# Patient Record
Sex: MALE | Race: BLACK OR AFRICAN AMERICAN | Employment: UNEMPLOYED | URBAN - METROPOLITAN AREA
[De-identification: names, ages, dates, MRNs, and addresses within clinical notes are randomized per-mention and may not be internally consistent; named-entity substitution may affect disease eponyms.]

---

## 2017-07-04 ENCOUNTER — HOSPITAL ENCOUNTER (EMERGENCY)
Age: 27
Discharge: PSYCHIATRIC OR SUBSTANCE USE FACILITY | End: 2017-07-05
Attending: EMERGENCY MEDICINE
Payer: SELF-PAY

## 2017-07-04 ENCOUNTER — APPOINTMENT (OUTPATIENT)
Dept: CT IMAGING | Age: 27
End: 2017-07-04
Attending: EMERGENCY MEDICINE
Payer: SELF-PAY

## 2017-07-04 DIAGNOSIS — R45.851 SUICIDAL IDEATION: Primary | ICD-10-CM

## 2017-07-04 LAB
ALBUMIN SERPL BCP-MCNC: 3.8 G/DL (ref 3.4–5)
ALBUMIN/GLOB SERPL: 0.9 {RATIO} (ref 0.8–1.7)
ALP SERPL-CCNC: 90 U/L (ref 45–117)
ALT SERPL-CCNC: 26 U/L (ref 16–61)
ANION GAP BLD CALC-SCNC: 5 MMOL/L (ref 3–18)
AST SERPL W P-5'-P-CCNC: 29 U/L (ref 15–37)
BASOPHILS # BLD AUTO: 0 K/UL (ref 0–0.06)
BASOPHILS # BLD: 0 % (ref 0–3)
BILIRUB SERPL-MCNC: 1 MG/DL (ref 0.2–1)
BUN SERPL-MCNC: 13 MG/DL (ref 7–18)
BUN/CREAT SERPL: 14 (ref 12–20)
CALCIUM SERPL-MCNC: 9.3 MG/DL (ref 8.5–10.1)
CHLORIDE SERPL-SCNC: 111 MMOL/L (ref 100–108)
CO2 SERPL-SCNC: 27 MMOL/L (ref 21–32)
CREAT SERPL-MCNC: 0.92 MG/DL (ref 0.6–1.3)
DIFFERENTIAL METHOD BLD: ABNORMAL
EOSINOPHIL # BLD: 0.1 K/UL (ref 0–0.4)
EOSINOPHIL NFR BLD: 1 % (ref 0–5)
ERYTHROCYTE [DISTWIDTH] IN BLOOD BY AUTOMATED COUNT: 13.7 % (ref 11.6–14.5)
ETHANOL SERPL-MCNC: <3 MG/DL (ref 0–3)
GLOBULIN SER CALC-MCNC: 4.4 G/DL (ref 2–4)
GLUCOSE SERPL-MCNC: 103 MG/DL (ref 74–99)
HCT VFR BLD AUTO: 40.2 % (ref 36–48)
HGB BLD-MCNC: 14.1 G/DL (ref 13–16)
LYMPHOCYTES # BLD AUTO: 32 % (ref 20–51)
LYMPHOCYTES # BLD: 2.9 K/UL (ref 0.8–3.5)
MCH RBC QN AUTO: 31.3 PG (ref 24–34)
MCHC RBC AUTO-ENTMCNC: 35.1 G/DL (ref 31–37)
MCV RBC AUTO: 89.3 FL (ref 74–97)
MONOCYTES # BLD: 0.4 K/UL (ref 0–1)
MONOCYTES NFR BLD AUTO: 4 % (ref 2–9)
NEUTS BAND NFR BLD MANUAL: 1 % (ref 0–5)
NEUTS SEG # BLD: 5.4 K/UL (ref 1.8–8)
NEUTS SEG NFR BLD AUTO: 58 % (ref 42–75)
PLATELET # BLD AUTO: 254 K/UL (ref 135–420)
PLATELET COMMENTS,PCOM: ABNORMAL
PMV BLD AUTO: 9.6 FL (ref 9.2–11.8)
POTASSIUM SERPL-SCNC: 4 MMOL/L (ref 3.5–5.5)
PROT SERPL-MCNC: 8.2 G/DL (ref 6.4–8.2)
RBC # BLD AUTO: 4.5 M/UL (ref 4.7–5.5)
RBC MORPH BLD: ABNORMAL
SODIUM SERPL-SCNC: 143 MMOL/L (ref 136–145)
WBC # BLD AUTO: 9.1 K/UL (ref 4.6–13.2)
WBC OTHER # BLD MANUAL: 4 10*3/UL

## 2017-07-04 PROCEDURE — 80053 COMPREHEN METABOLIC PANEL: CPT | Performed by: EMERGENCY MEDICINE

## 2017-07-04 PROCEDURE — 80361 OPIATES 1 OR MORE: CPT | Performed by: EMERGENCY MEDICINE

## 2017-07-04 PROCEDURE — 80353 DRUG SCREENING COCAINE: CPT | Performed by: EMERGENCY MEDICINE

## 2017-07-04 PROCEDURE — 80307 DRUG TEST PRSMV CHEM ANLYZR: CPT | Performed by: EMERGENCY MEDICINE

## 2017-07-04 PROCEDURE — 82542 COL CHROMOTOGRAPHY QUAL/QUAN: CPT | Performed by: EMERGENCY MEDICINE

## 2017-07-04 PROCEDURE — 70450 CT HEAD/BRAIN W/O DYE: CPT

## 2017-07-04 PROCEDURE — 99284 EMERGENCY DEPT VISIT MOD MDM: CPT

## 2017-07-04 PROCEDURE — 85025 COMPLETE CBC W/AUTO DIFF WBC: CPT | Performed by: EMERGENCY MEDICINE
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
## 2017-07-04 NOTE — ED NOTES
Bedside shift change report given to Greer Shukla (oncoming nurse) by Krishna Jones (offgoing nurse). Report included the following information SBAR, ED Summary, Procedure Summary, Intake/Output, MAR, Accordion, Recent Results and Med Rec Status.
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
## 2017-07-04 NOTE — BSMART NOTE
Contacted by ER Physician Dr. Griffin Bynum regarding this patient a 33year old male who was seen in the ER Bed: 21.  Patient presents with depression, suicidal thoughts and substance abuse. Patient states, \"I am here because I am depressed and suicidal.  I have a fucked up life. \"  He has been homeless for [place] 2 months\" after living with a friend who has since moved from the area. He states he is originally from [place] but, \"There ain't nothing, nobody there for me. \"  He has a history of suicidal acts as patient states, \"I cut my wrists a couple of years ago. \"  He denies homicidal ideations/thoughts to harm others. He indicates that he has visual (a grant) and auditory (humans telling him, \"You're better off dead. \") hallucinations. He denies any inpatient treatment for depression and/or suicidal ideations but states \"I was at [facility] in [place] at the end of last year, for 3 months, for rehab for crack cocaine. \"  He admits to snorting heroine in the past, smoking marijuana and using crack cocaine. Patient states, \"I just need some help. \"      Patient will be referred to [facility] for assessment and evaluation for a possible Crisis Stabilization Unit or other facility.         Travis Darnell, RN, BSN

## 2017-07-05 VITALS
DIASTOLIC BLOOD PRESSURE: 72 MMHG | TEMPERATURE: 98.5 F | BODY MASS INDEX: 16.81 KG/M2 | HEIGHT: 74 IN | OXYGEN SATURATION: 98 % | SYSTOLIC BLOOD PRESSURE: 107 MMHG | HEART RATE: 61 BPM | WEIGHT: 131 LBS | RESPIRATION RATE: 18 BRPM

## 2017-07-05 NOTE — ED NOTES
Received bedside report from Halley Torres RN, pt Is alert and oriented, calm and cooperative at this time. Pt awaiting placement. Pt states he wanted to kill himslef by walking in to traffic.  Pt has no prior medical or mental health history, takes no medications has never been admitted for psych issues in the past.

## 2017-07-05 NOTE — ED NOTES
The L.C. met with the client prior to being discharged from the ED to discuss follow up healthcare/PCP options after being discharged. The client received a schedule for the month of July 2017 [address] [place] [address]. The L. C. briefly explained the process and informed the client they will receive a follow up call from the [address] [place] [address] on Monday, July 10, 2017 for an overall health check.

## 2017-07-05 NOTE — BSMART NOTE
Jose G Guerra of [employer] has informed that the patient has been accepted to [facility] in [place].     Admission is for Today July 5 @ 1400  Accepting is Dr. Steven Skill  Report # [phone]

## 2017-07-13 LAB
AMPHET UR QL SCN: NEGATIVE
BARBITURATES UR QL SCN: NEGATIVE
BENZODIAZ UR QL: NEGATIVE
BZE UR CFM-MCNC: NORMAL NG/ML
BZE UR QL: NORMAL
CANNABINOIDS UR QL CFM: POSITIVE
CANNABINOIDS UR QL SCN: POSITIVE
COCAINE UR QL SCN: POSITIVE
CODEINE UR QL: NEGATIVE
HDSCOM,HDSCOM: ABNORMAL
HYDROCODONE UR QL: NEGATIVE
HYDROMORPHONE UR QL: NEGATIVE
METHADONE UR QL: NEGATIVE
MORPHINE UR CFM-MCNC: 702 NG/ML
MORPHINE UR QL: POSITIVE
OPIATES UR QL: POSITIVE
OPIATES UR QL: POSITIVE NG/ML
PCP UR QL: NEGATIVE
THC UR CFM-MCNC: 21 NG/ML

## 2017-08-16 ENCOUNTER — APPOINTMENT (OUTPATIENT)
Dept: GENERAL RADIOLOGY | Age: 27
End: 2017-08-16
Attending: EMERGENCY MEDICINE
Payer: SELF-PAY

## 2017-08-16 ENCOUNTER — HOSPITAL ENCOUNTER (EMERGENCY)
Age: 27
Discharge: HOME | End: 2017-08-16
Attending: EMERGENCY MEDICINE | Admitting: EMERGENCY MEDICINE
Payer: SELF-PAY

## 2017-08-16 ENCOUNTER — APPOINTMENT (OUTPATIENT)
Dept: CT IMAGING | Age: 27
End: 2017-08-16
Attending: EMERGENCY MEDICINE
Payer: SELF-PAY

## 2017-08-16 VITALS
DIASTOLIC BLOOD PRESSURE: 79 MMHG | OXYGEN SATURATION: 100 % | HEART RATE: 76 BPM | RESPIRATION RATE: 16 BRPM | TEMPERATURE: 97.7 F | WEIGHT: 131 LBS | BODY MASS INDEX: 16.82 KG/M2 | SYSTOLIC BLOOD PRESSURE: 109 MMHG

## 2017-08-16 DIAGNOSIS — S09.90XA CHI (CLOSED HEAD INJURY), INITIAL ENCOUNTER: ICD-10-CM

## 2017-08-16 DIAGNOSIS — S00.81XA FOREHEAD ABRASION, INITIAL ENCOUNTER: ICD-10-CM
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
## 2017-08-16 DIAGNOSIS — F10.920 ALCOHOL INTOXICATION, UNCOMPLICATED (HCC): ICD-10-CM

## 2017-08-16 DIAGNOSIS — R51.9 FACIAL PAIN: ICD-10-CM

## 2017-08-16 DIAGNOSIS — E87.6 HYPOKALEMIA: ICD-10-CM

## 2017-08-16 LAB
ANION GAP BLD CALC-SCNC: 13 MMOL/L (ref 3–18)
BASOPHILS # BLD: 0 K/UL (ref 0–0.06)
BASOPHILS NFR BLD: 0 % (ref 0–2)
BUN SERPL-MCNC: 8 MG/DL (ref 7–18)
BUN/CREAT SERPL: 13 (ref 12–20)
CALCIUM SERPL-MCNC: 7.5 MG/DL (ref 8.5–10.1)
CHLORIDE SERPL-SCNC: 108 MMOL/L (ref 100–108)
CO2 SERPL-SCNC: 25 MMOL/L (ref 21–32)
CREAT SERPL-MCNC: 0.62 MG/DL (ref 0.6–1.3)
DIFFERENTIAL METHOD BLD: ABNORMAL
EOSINOPHIL # BLD: 0.2 K/UL (ref 0–0.4)
EOSINOPHIL NFR BLD: 2 % (ref 0–5)
ERYTHROCYTE [DISTWIDTH] IN BLOOD BY AUTOMATED COUNT: 14.5 % (ref 11.6–14.5)
ETHANOL SERPL-MCNC: 84 MG/DL (ref 0–3)
GLUCOSE SERPL-MCNC: 88 MG/DL (ref 74–99)
HCT VFR BLD AUTO: 41.4 % (ref 36–48)
HGB BLD-MCNC: 13.6 G/DL (ref 13–16)
LYMPHOCYTES # BLD: 2.1 K/UL (ref 0.9–3.6)
LYMPHOCYTES NFR BLD: 16 % (ref 21–52)
MCH RBC QN AUTO: 30.7 PG (ref 24–34)
MCHC RBC AUTO-ENTMCNC: 32.9 G/DL (ref 31–37)
MCV RBC AUTO: 93.5 FL (ref 74–97)
MONOCYTES # BLD: 0.7 K/UL (ref 0.05–1.2)
MONOCYTES NFR BLD: 5 % (ref 3–10)
NEUTS SEG # BLD: 10.1 K/UL (ref 1.8–8)
NEUTS SEG NFR BLD: 77 % (ref 40–73)
PLATELET # BLD AUTO: 245 K/UL (ref 135–420)
PMV BLD AUTO: 9.7 FL (ref 9.2–11.8)
POTASSIUM SERPL-SCNC: 3.3 MMOL/L (ref 3.5–5.5)
RBC # BLD AUTO: 4.43 M/UL (ref 4.7–5.5)
SODIUM SERPL-SCNC: 146 MMOL/L (ref 136–145)
WBC # BLD AUTO: 13.1 K/UL (ref 4.6–13.2)

## 2017-08-16 PROCEDURE — 74011250636 HC RX REV CODE- 250/636: Performed by: EMERGENCY MEDICINE

## 2017-08-16 PROCEDURE — 71010 XR CHEST PORT: CPT

## 2017-08-16 PROCEDURE — 70486 CT MAXILLOFACIAL W/O DYE: CPT

## 2017-08-16 PROCEDURE — 72125 CT NECK SPINE W/O DYE: CPT

## 2017-08-16 PROCEDURE — 85025 COMPLETE CBC W/AUTO DIFF WBC: CPT | Performed by: EMERGENCY MEDICINE

## 2017-08-16 PROCEDURE — 74011250637 HC RX REV CODE- 250/637: Performed by: EMERGENCY MEDICINE

## 2017-08-16 PROCEDURE — 99284 EMERGENCY DEPT VISIT MOD MDM: CPT

## 2017-08-16 PROCEDURE — 70450 CT HEAD/BRAIN W/O DYE: CPT

## 2017-08-16 PROCEDURE — 96360 HYDRATION IV INFUSION INIT: CPT

## 2017-08-16 PROCEDURE — 80048 BASIC METABOLIC PNL TOTAL CA: CPT | Performed by: EMERGENCY MEDICINE

## 2017-08-16 PROCEDURE — 96361 HYDRATE IV INFUSION ADD-ON: CPT

## 2017-08-16 PROCEDURE — 80307 DRUG TEST PRSMV CHEM ANLYZR: CPT | Performed by: EMERGENCY MEDICINE

## 2017-08-16 RX ORDER — POTASSIUM CHLORIDE 20 MEQ/1
20 TABLET, EXTENDED RELEASE ORAL
Status: COMPLETED | OUTPATIENT
Start: 2017-08-16 | End: 2017-08-16

## 2017-08-16 RX ADMIN — SODIUM CHLORIDE 1000 ML: 900 INJECTION, SOLUTION INTRAVENOUS at 08:09

## 2017-08-16 RX ADMIN — POTASSIUM CHLORIDE 20 MEQ: 20 TABLET, EXTENDED RELEASE ORAL at 07:21

## 2017-08-16 NOTE — DISCHARGE INSTRUCTIONS
Scrapes (Abrasions): Care Instructions  Your Care Instructions  Scrapes (abrasions) are wounds where your skin has been rubbed or torn off. Most scrapes do not go deep into the skin, but some may remove several layers of skin. Scrapes usually don't bleed much, but they may ooze pinkish fluid. Scrapes on the head or face may appear worse than they are. They may bleed a lot because of the good blood supply to this area. Most scrapes heal well and may not need a bandage. They usually heal within 3 to 7 days. A large, deep scrape may take 1 to 2 weeks or longer to heal. A scab may form on some scrapes. Follow-up care is a key part of your treatment and safety. Be sure to make and go to all appointments, and call your doctor if you are having problems. It's also a good idea to know your test results and keep a list of the medicines you take. How can you care for yourself at home? · If your doctor told you how to care for your wound, follow your doctor's instructions. If you did not get instructions, follow this general advice:  ¨ Wash the scrape with clean water 2 times a day. Don't use hydrogen peroxide or alcohol, which can slow healing. ¨ You may cover the scrape with a thin layer of petroleum jelly, such as Vaseline, and a nonstick bandage. ¨ Apply more petroleum jelly and replace the bandage as needed. · Prop up the injured area on a pillow anytime you sit or lie down during the next 3 days. Try to keep it above the level of your heart. This will help reduce swelling. · Be safe with medicines. Take pain medicines exactly as directed. ¨ If the doctor gave you a prescription medicine for pain, take it as prescribed. ¨ If you are not taking a prescription pain medicine, ask your doctor if you can take an over-the-counter medicine. When should you call for help?   Call your doctor now or seek immediate medical care if:  · You have signs of infection, such as:  ¨ Increased pain, swelling, warmth, or redness around the scrape. ¨ Red streaks leading from the scrape. ¨ Pus draining from the scrape. ¨ A fever. · The scrape starts to bleed, and blood soaks through the bandage. Oozing small amounts of blood is normal.  Watch closely for changes in your health, and be sure to contact your doctor if the scrape is not getting better each day. Where can you learn more? Go to http://regine-lupe.info/. Enter A374 in the search box to learn more about \"Scrapes (Abrasions): Care Instructions. \"  Current as of: March 20, 2017  Content Version: 11.3  © 7457-5653 [employer]. Care instructions adapted under license by [employer] (which disclaims liability or warranty for this information). If you have questions about a medical condition or this instruction, always ask your healthcare professional. [place] [address] any warranty or liability for your use of this information. Head Injury: Care Instructions  Your Care Instructions  Most injuries to the head are minor. Bumps, cuts, and scrapes on the head and face usually heal well and can be treated the same as injuries to other parts of the body. Although it's rare, once in a while a more serious problem shows up after you are home. So it's good to be on the lookout for symptoms for a day or two. Follow-up care is a key part of your treatment and safety. Be sure to make and go to all appointments, and call your doctor if you are having problems. It's also a good idea to know your test results and keep a list of the medicines you take. How can you care for yourself at home? · Follow your doctor's instructions. He or she will tell you if you need someone to watch you closely for the next 24 hours or longer. · Take it easy for the next few days or more if you are not feeling well.   · Ask your doctor when it's okay for you to go back to activities like driving a car, riding a bike, or operating machinery. When should you call for help? Call 911 anytime you think you may need emergency care. For example, call if:  · You have a seizure. · You passed out (lost consciousness). · You are confused or can't stay awake. Call your doctor now or seek immediate medical care if:  · You have new or worse vomiting. · You feel less alert. · You have new weakness or numbness in any part of your body. Watch closely for changes in your health, and be sure to contact your doctor if:  · You do not get better as expected. · You have new symptoms, such as headaches, trouble concentrating, or changes in mood. Where can you learn more? Go to http://regine-lupe.info/. Enter Y638 in the search box to learn more about \"Head Injury: Care Instructions. \"  Current as of: October 14, 2016  Content Version: 11.3  © 3092-1111 [employer]. Care instructions adapted under license by [employer] (which disclaims liability or warranty for this information). If you have questions about a medical condition or this instruction, always ask your healthcare professional. [place] [address] any warranty or liability for your use of this information. Hypokalemia: Care Instructions  Your Care Instructions  Hypokalemia (say \"em-xg-gkw-JEROME-chet-uh\") is a low level of potassium. The heart, muscles, kidneys, and nervous system all need potassium to work well. This problem has many different causes. Kidney problems, diet, and medicines like diuretics and laxatives can cause it. So can vomiting or diarrhea. In some cases, cancer is the cause. Your doctor may do tests to find the cause of your low potassium levels. You may need medicines to bring your potassium levels back to normal. You may also need regular blood tests to check your potassium. If you have very low potassium, you may need intravenous (IV) medicines.  You also may need tests to check the electrical activity of your heart. Heart problems caused by low potassium levels can be very serious. Follow-up care is a key part of your treatment and safety. Be sure to make and go to all appointments, and call your doctor if you are having problems. It's also a good idea to know your test results and keep a list of the medicines you take. How can you care for yourself at home? · If your doctor recommends it, eat foods that have a lot of potassium. These include fresh fruits, juices, and vegetables. They also include nuts, beans, and milk. · Be safe with medicines. If your doctor prescribes medicines or potassium supplements, take them exactly as directed. Call your doctor if you have any problems with your medicines. · Get your potassium levels tested as often as your doctor tells you. When should you call for help? Call 911 anytime you think you may need emergency care. For example, call if:  · You feel like your heart is missing beats. Heart problems caused by low potassium can cause death. · You passed out (lost consciousness). · You have a seizure. Call your doctor now or seek immediate medical care if:  · You feel weak or unusually tired. · You have severe arm or leg cramps. · You have tingling or numbness. · You feel sick to your stomach, or you vomit. · You have belly cramps. · You feel bloated or constipated. · You have to urinate a lot. · You feel very thirsty most of the time. · You are dizzy or lightheaded, or you feel like you may faint. · You feel depressed, or you lose touch with reality. Watch closely for changes in your health, and be sure to contact your doctor if:  · You do not get better as expected. Where can you learn more? Go to http://regine-lupe.info/. Enter G358 in the search box to learn more about \"Hypokalemia: Care Instructions. \"  Current as of: July 28, 2016  Content Version: 11.3  © 4080-8576 [facility].  Care instructions adapted under license by [employer] (which disclaims liability or warranty for this information). If you have questions about a medical condition or this instruction, always ask your healthcare professional. [address] any warranty or liability for your use of this information.

## 2017-08-16 NOTE — ED NOTES
Pt asked to attempt to ambulate and will be discharged soon. Pt ambulatory to bathroom without difficulty. Upon attempting to return to room pt stumbling about. Pt assisted to room and back to bed. MD at bedside to evaluate pt. Will hold on discharge for now.

## 2017-08-16 NOTE — ED NOTES
Patient is sleeping at this time. Patient is easily arousable by verbal stimuli. Will continue to monitor.

## 2017-08-16 NOTE — ED NOTES
Pt ambulatory to bathroom with steady gate. Pt returned to bathroom without any walking difficulty/imbalance. MD aware.
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
## 2017-08-16 NOTE — ED NOTES
7:00 AM :Pt care assumed from Dr. Chyna Plascencia, ED provider. Pt complaint(s), current treatment plan, progression and available diagnostic results have been discussed thoroughly. Rounding occurred: yes  Intended Disposition: TBD   Pending diagnostic reports and/or labs (please list): reassessment     9:18 AM Patient reassessed. Awake, alert and oriented. Ambulating with steady gait. Sitting up in bed eating crackers and drinking juice. Stable for discharge.

## 2017-09-18 ENCOUNTER — HOSPITAL ENCOUNTER (EMERGENCY)
Age: 27
Discharge: OTHER INSTITUTION | End: 2017-09-18
Attending: EMERGENCY MEDICINE
Payer: SELF-PAY

## 2017-09-18 VITALS
BODY MASS INDEX: 16.81 KG/M2 | SYSTOLIC BLOOD PRESSURE: 117 MMHG | DIASTOLIC BLOOD PRESSURE: 72 MMHG | TEMPERATURE: 98 F | OXYGEN SATURATION: 99 % | WEIGHT: 131 LBS | RESPIRATION RATE: 12 BRPM | HEIGHT: 74 IN | HEART RATE: 68 BPM

## 2017-09-18 DIAGNOSIS — R45.851 SUICIDAL IDEATIONS: Primary | ICD-10-CM

## 2017-09-18 LAB
ALBUMIN SERPL-MCNC: 3.8 G/DL (ref 3.4–5)
ALBUMIN/GLOB SERPL: 0.9 {RATIO} (ref 0.8–1.7)
ALP SERPL-CCNC: 88 U/L (ref 45–117)
ALT SERPL-CCNC: 25 U/L (ref 16–61)
AMPHET UR QL SCN: NEGATIVE
ANION GAP SERPL CALC-SCNC: 7 MMOL/L (ref 3–18)
APPEARANCE UR: CLEAR
AST SERPL-CCNC: 18 U/L (ref 15–37)
BARBITURATES UR QL SCN: NEGATIVE
BASOPHILS # BLD: 0 K/UL (ref 0–0.06)
BASOPHILS NFR BLD: 0 % (ref 0–2)
BENZODIAZ UR QL: NEGATIVE
BILIRUB SERPL-MCNC: 0.5 MG/DL (ref 0.2–1)
BILIRUB UR QL: NEGATIVE
BUN SERPL-MCNC: 13 MG/DL (ref 7–18)
BUN/CREAT SERPL: 14 (ref 12–20)
CALCIUM SERPL-MCNC: 9.3 MG/DL (ref 8.5–10.1)
CANNABINOIDS UR QL SCN: POSITIVE
CHLORIDE SERPL-SCNC: 105 MMOL/L (ref 100–108)
CO2 SERPL-SCNC: 28 MMOL/L (ref 21–32)
COCAINE UR QL SCN: POSITIVE
COLOR UR: YELLOW
CREAT SERPL-MCNC: 0.94 MG/DL (ref 0.6–1.3)
DIFFERENTIAL METHOD BLD: ABNORMAL
EOSINOPHIL # BLD: 0.6 K/UL (ref 0–0.4)
EOSINOPHIL NFR BLD: 5 % (ref 0–5)
ERYTHROCYTE [DISTWIDTH] IN BLOOD BY AUTOMATED COUNT: 13.3 % (ref 11.6–14.5)
ETHANOL SERPL-MCNC: 3 MG/DL (ref 0–3)
GLOBULIN SER CALC-MCNC: 4.4 G/DL (ref 2–4)
GLUCOSE SERPL-MCNC: 104 MG/DL (ref 74–99)
GLUCOSE UR STRIP.AUTO-MCNC: NEGATIVE MG/DL
HCT VFR BLD AUTO: 45.1 % (ref 36–48)
HDSCOM,HDSCOM: ABNORMAL
HGB BLD-MCNC: 15.5 G/DL (ref 13–16)
HGB UR QL STRIP: NEGATIVE
KETONES UR QL STRIP.AUTO: NEGATIVE MG/DL
LEUKOCYTE ESTERASE UR QL STRIP.AUTO: NEGATIVE
LYMPHOCYTES # BLD: 3.9 K/UL (ref 0.9–3.6)
LYMPHOCYTES NFR BLD: 34 % (ref 21–52)
MCH RBC QN AUTO: 32.1 PG (ref 24–34)
MCHC RBC AUTO-ENTMCNC: 34.4 G/DL (ref 31–37)
MCV RBC AUTO: 93.4 FL (ref 74–97)
METHADONE UR QL: NEGATIVE
MONOCYTES # BLD: 0.7 K/UL (ref 0.05–1.2)
MONOCYTES NFR BLD: 6 % (ref 3–10)
NEUTS SEG # BLD: 6 K/UL (ref 1.8–8)
NEUTS SEG NFR BLD: 55 % (ref 40–73)
NITRITE UR QL STRIP.AUTO: NEGATIVE
OPIATES UR QL: NEGATIVE
PCP UR QL: NEGATIVE
PH UR STRIP: 6.5 [PH] (ref 5–8)
PLATELET # BLD AUTO: 230 K/UL (ref 135–420)
PMV BLD AUTO: 10 FL (ref 9.2–11.8)
POTASSIUM SERPL-SCNC: 3.7 MMOL/L (ref 3.5–5.5)
PROT SERPL-MCNC: 8.2 G/DL (ref 6.4–8.2)
PROT UR STRIP-MCNC: NEGATIVE MG/DL
RBC # BLD AUTO: 4.83 M/UL (ref 4.7–5.5)
SODIUM SERPL-SCNC: 140 MMOL/L (ref 136–145)
SP GR UR REFRACTOMETRY: 1.02 (ref 1–1.03)
UROBILINOGEN UR QL STRIP.AUTO: 2 EU/DL (ref 0.2–1)
WBC # BLD AUTO: 11.2 K/UL (ref 4.6–13.2)

## 2017-09-18 PROCEDURE — 80307 DRUG TEST PRSMV CHEM ANLYZR: CPT | Performed by: EMERGENCY MEDICINE

## 2017-09-18 PROCEDURE — 80053 COMPREHEN METABOLIC PANEL: CPT | Performed by: EMERGENCY MEDICINE

## 2017-09-18 PROCEDURE — 99285 EMERGENCY DEPT VISIT HI MDM: CPT

## 2017-09-18 PROCEDURE — 85025 COMPLETE CBC W/AUTO DIFF WBC: CPT | Performed by: EMERGENCY MEDICINE

## 2017-09-18 PROCEDURE — 81003 URINALYSIS AUTO W/O SCOPE: CPT | Performed by: EMERGENCY MEDICINE

## 2017-09-18 RX ORDER — POTASSIUM CHLORIDE 20 MEQ/1
40 TABLET, EXTENDED RELEASE ORAL
Status: DISCONTINUED | OUTPATIENT
Start: 2017-09-18 | End: 2017-09-18

## 2017-09-18 NOTE — ED NOTES
Pt ate breakfast and was given a cup of apple juice. Pt is polite and compliant watching tv in bed. Pt in NAD and call button is within reach.

## 2017-09-18 NOTE — ED TRIAGE NOTES
Pt states he feels like hurting himself. Pt states life is wrong and he is thinking about walking into traffic.

## 2017-09-18 NOTE — ED NOTES
Bedside shift change report given to [facility] (oncoming nurse) by Haley Hickman RN (offgoing nurse). Report included the following information SBAR.

## 2017-09-18 NOTE — ED NOTES
TRANSFER - OUT REPORT:    Verbal report given to [facility], RN(name) on Paola Ruiz  being transferred to [facility] (unit) for routine progression of care       Report consisted of patients Situation, Background, Assessment and   Recommendations(SBAR). Information from the following report(s) SBAR, ED Summary and Recent Results was reviewed with the receiving nurse. Lines:       Opportunity for questions and clarification was provided.       Transported by [facility] [address] [place] [address]

## 2017-09-18 NOTE — BSMART NOTE
Per Enmanuel Loja with [facility] ([facility]), patient has been accepted at [facility] (CSU). Accepting physician is Dr. Sylvester Bustamante. Patient should be transported from [facility] ER to arrive at CSU by 2100 hours today, Monday, September 18, 2017.       Simon Olivares RN, BSN

## 2017-09-18 NOTE — ED NOTES
7:18 AM :Pt care assumed from Dr. Heriberto Pruitt, ED provider. Pt complaint(s), current treatment plan, progression and available diagnostic results have been discussed thoroughly. Rounding occurred: yes  Intended Disposition: TBD   Pending diagnostic reports and/or labs (please list): to be seen by crisis    9:44 AM  Update: Yazmin Dela Cruz from crisis has evaluated patient and will discuss patient with CSB for crisis stabilization. 1:33 PM Pt seen by CSB and she is putting in request for crisis stabilization. Pt resting, cooperative, no apparent distress. 5:25 PM  Patient has been accepted by Dr. [facility] at [address]. Patient Vitals for the past 12 hrs:   Temp Pulse Resp BP SpO2   09/18/17 0816 98 °F (36.7 °C) 68 12 117/72 99 %   09/18/17 0428 97.2 °F (36.2 °C) 70 16 126/86 100 %       Scribe Attestation      [employer] acting as a scribe for and in the presence of Karl Glaser DO      September 18, 2017 at 9:44 AM       Provider Attestation:      I personally performed the services described in the documentation, reviewed the documentation, as recorded by the scribe in my presence, and it accurately and completely records my words and actions. September 18, 2017 at 9:44 AM - Karl Glaser DO      1.  Suicidal ideations

## 2017-09-18 NOTE — BSMART NOTE
33year old M seen in the ED room 13 at the request of Dr. Kenzie Wooten. CC: Feels depressed with frequent SI, last occurring at 4:30 this morning. Planned to walk into traffic. \"I want a better way of doing things. \"     Standing by bed when this writer went to his room. Patient had just finished breakfast. Adequate hygiene, neat appearance. Eye contact good. Cooperative, answering all questions. Alert and oriented x4. Affect restricted. Describes mood as depressed and sleepy. Unemployed. Has not worked since last year. Single. May 2016 he moved from [facility] to [place]. He is currently Homeless as of 1.5 months ago. Sleeps on benches in parking lots. History of being treated at [facility] in July. Was unable to make follow-up appointsment due to lack of transportation. Since then, the patient's sister has become more involved and is willing to assist patient. On no medications. Denies Other medical or surgical issues. Thought process goal directed; although patient endorses occasional racing thoughts. Thought content unremarkable. Denies Auditory/Visual hallucinations. Denies Homicidal ideation. Endorses vague suicidal ideation. Sleeps 3-4 hours a night. Appetite good although he states he has had some weight loss. UDS + for [facility] and cocaine. Uses \"a lot\" of crack daily. Smokes 1 blunt a week. Drink 2 24 oz cans/day. Denies current withdrawal symptoms. Plan to have [facility] evaluate for a crisis stabilization bed. Discussed with Dr Miguelito Almanza.

## 2017-09-18 NOTE — ED NOTES
Bedside shift change report given to Marycarmen Lazar RN (oncoming nurse) by Flaco Nuñez RN (offgoing nurse). Report included the following information SBAR, ED Summary and Recent Results.

## 2017-09-18 NOTE — ED NOTES
Pt has gone from sleeping in bed to awake and watching tv multiple times throughout hour. Pt is a&ox4 and in NAD. Call button is in reach.

## 2017-09-18 NOTE — ED NOTES
PT is speaking with crisis in the room and shows NAD at this time. Call button is in reach and room lights are on.

## 2017-09-18 NOTE — ED PROVIDER NOTES
HPI Comments: Lianne Lui is a 33 y.o. male with hx of psychiatric disorder presenting to the ED for suicidal ideations that began a couple months ago. Pt states he feels like hurting himself and thinking of walking into traffic, but has not acted on thoughts. Pt was admitted to [facility] in July 2017 for same sx. Pt also reports right sided rib cage pain, denies any injuries to the area. Admits ETOH use, \"almost everyday, 12 pack of beer\", last ETOH intake was yesterday. Denies any drug use. Pt is currently homeless. The history is provided by the patient. Past Medical History:   Diagnosis Date    Psychiatric disorder        History reviewed. No pertinent surgical history. History reviewed. No pertinent family history. Social History     Social History    Marital status: SINGLE     Spouse name: N/A    Number of children: N/A    Years of education: N/A     Occupational History    Not on file. Social History Main Topics    Smoking status: Current Every Day Smoker     Packs/day: 0.50     Years: 5.00    Smokeless tobacco: Current User    Alcohol use 3.0 oz/week     3 Cans of beer, 2 Shots of liquor per week      Comment: daily    Drug use: Yes     Special: Cocaine, Opiates, Marijuana    Sexual activity: No     Other Topics Concern    Not on file     Social History Narrative         ALLERGIES: Review of patient's allergies indicates no known allergies. Review of Systems   Constitutional: Negative for diaphoresis, fatigue and fever. HENT: Negative for congestion, sore throat and trouble swallowing. Eyes: Negative for pain, redness, itching and visual disturbance. Respiratory: Negative for cough, chest tightness and shortness of breath. Cardiovascular: Negative for chest pain, palpitations and leg swelling. Gastrointestinal: Negative for abdominal pain, diarrhea, nausea and vomiting. Genitourinary: Negative for decreased urine volume, dysuria and flank pain. Musculoskeletal: Negative for arthralgias, back pain, gait problem and myalgias. Right side pain rib cage pain    Skin: Negative for color change, rash and wound. Neurological: Negative for dizziness, weakness, numbness and headaches. Psychiatric/Behavioral: Positive for suicidal ideas. Suicidal plan    All other systems reviewed and are negative. Vitals:    09/18/17 0428   BP: 126/86   Pulse: 70   Resp: 16   Temp: 97.2 °F (36.2 °C)   SpO2: 100%   Weight: 59.4 kg (131 lb)   Height: 6' 2\" (1.88 m)            Physical Exam   Constitutional: He appears well-developed and well-nourished. No distress. HENT:   Head: Normocephalic and atraumatic. Mouth/Throat: Oropharynx is clear and moist.   Eyes: Conjunctivae and EOM are normal. Pupils are equal, round, and reactive to light. Neck: Normal range of motion. Neck supple. Cardiovascular: Normal rate, regular rhythm and normal heart sounds. No murmur heard. Pulmonary/Chest: Effort normal and breath sounds normal.   Abdominal: Soft. Bowel sounds are normal. He exhibits no distension. There is no tenderness. Musculoskeletal: Normal range of motion. He exhibits no edema or deformity. Lymphadenopathy:     He has no cervical adenopathy. Neurological: He is alert. He exhibits normal muscle tone. Coordination normal.   Skin: Skin is warm and dry. No rash noted. No erythema. Psychiatric: He has a normal mood and affect. His behavior is normal. He expresses suicidal ideation. He expresses no homicidal ideation. He expresses suicidal plans. Nursing note and vitals reviewed.        [facility]  ED Course       Procedures    Vitals:  Patient Vitals for the past 12 hrs:   Temp Pulse Resp BP SpO2   09/18/17 0428 97.2 °F (36.2 °C) 70 16 126/86 100 %           X-ray, CT or radiology findings or impressions:  No orders to display       Progress notes, consult notes, or additional procedure notes:    5:37 AM Consult: I discussed care with Barb (Crisis). It was a standard discussion including patient history, chief complaint, available diagnostic results, and predicted treatment course. Will have the morning team evaluate the pt.     7:15 AM Sign out given to Dr. Vinicius Krause, awaiting eval by psych crisis and disposition      Scribarlette Valle acting as a scribe for and in the presence of Lilli Ahumada, MD      September 18, 2017 at 4:54 AM       Provider Attestation:      I personally performed the services described in the documentation, reviewed the documentation, as recorded by the scribe in my presence, and it accurately and completely records my words and actions.  September 18, 2017 at 4:54 AM - Lilli Ahumada, MD

## 2023-03-10 ENCOUNTER — HOSPITAL ENCOUNTER (INPATIENT)
Facility: HOSPITAL | Age: 33
LOS: 4 days | Discharge: HOME | DRG: 751 | End: 2023-03-14
Attending: STUDENT IN AN ORGANIZED HEALTH CARE EDUCATION/TRAINING PROGRAM | Admitting: PSYCHIATRY & NEUROLOGY
Payer: MEDICAID

## 2023-03-10 DIAGNOSIS — R45.851 SUICIDAL IDEATION: Primary | ICD-10-CM

## 2023-03-10 DIAGNOSIS — Z59.00 HOMELESS: ICD-10-CM

## 2023-03-10 DIAGNOSIS — F10.10 ETOH ABUSE: ICD-10-CM

## 2023-03-10 DIAGNOSIS — F19.10 POLYSUBSTANCE ABUSE (HCC): ICD-10-CM

## 2023-03-10 PROBLEM — F32.A DEPRESSION: Status: ACTIVE | Noted: 2023-03-10

## 2023-03-10 LAB
ALBUMIN SERPL-MCNC: 3.5 G/DL (ref 3.4–5)
ALBUMIN/GLOB SERPL: 0.8 (ref 0.8–1.7)
ALP SERPL-CCNC: 86 U/L (ref 45–117)
ALT SERPL-CCNC: 24 U/L (ref 16–61)
AMPHET UR QL SCN: NEGATIVE
ANION GAP SERPL CALC-SCNC: 3 MMOL/L (ref 3–18)
AST SERPL-CCNC: 20 U/L (ref 10–38)
BARBITURATES UR QL SCN: NEGATIVE
BENZODIAZ UR QL: NEGATIVE
BILIRUB DIRECT SERPL-MCNC: 0.1 MG/DL (ref 0–0.2)
BILIRUB SERPL-MCNC: 0.3 MG/DL (ref 0.2–1)
BUN SERPL-MCNC: 15 MG/DL (ref 7–18)
BUN/CREAT SERPL: 18 (ref 12–20)
CALCIUM SERPL-MCNC: 9.7 MG/DL (ref 8.5–10.1)
CANNABINOIDS UR QL SCN: NEGATIVE
CHLORIDE SERPL-SCNC: 104 MMOL/L (ref 100–111)
CO2 SERPL-SCNC: 31 MMOL/L (ref 21–32)
COCAINE UR QL SCN: POSITIVE
CREAT SERPL-MCNC: 0.83 MG/DL (ref 0.6–1.3)
ETHANOL SERPL-MCNC: <3 MG/DL (ref 0–3)
FLUAV RNA SPEC QL NAA+PROBE: NOT DETECTED
FLUBV RNA SPEC QL NAA+PROBE: NOT DETECTED
GLOBULIN SER CALC-MCNC: 4.3 G/DL (ref 2–4)
GLUCOSE BLD STRIP.AUTO-MCNC: 113 MG/DL (ref 70–110)
GLUCOSE SERPL-MCNC: 66 MG/DL (ref 74–99)
Lab: ABNORMAL
METHADONE UR QL: NEGATIVE
OPIATES UR QL: NEGATIVE
PCP UR QL: NEGATIVE
POTASSIUM SERPL-SCNC: 3.7 MMOL/L (ref 3.5–5.5)
PROT SERPL-MCNC: 7.8 G/DL (ref 6.4–8.2)
SARS-COV-2 RNA RESP QL NAA+PROBE: NOT DETECTED
SODIUM SERPL-SCNC: 138 MMOL/L (ref 136–145)

## 2023-03-10 PROCEDURE — 80076 HEPATIC FUNCTION PANEL: CPT

## 2023-03-10 PROCEDURE — 80048 BASIC METABOLIC PNL TOTAL CA: CPT

## 2023-03-10 PROCEDURE — 80307 DRUG TEST PRSMV CHEM ANLYZR: CPT

## 2023-03-10 PROCEDURE — 82077 ASSAY SPEC XCP UR&BREATH IA: CPT

## 2023-03-10 PROCEDURE — 87636 SARSCOV2 & INF A&B AMP PRB: CPT

## 2023-03-10 PROCEDURE — 99285 EMERGENCY DEPT VISIT HI MDM: CPT

## 2023-03-10 PROCEDURE — 1240000000 HC EMOTIONAL WELLNESS R&B

## 2023-03-10 PROCEDURE — 82962 GLUCOSE BLOOD TEST: CPT

## 2023-03-10 SDOH — ECONOMIC STABILITY - HOUSING INSECURITY: HOMELESSNESS UNSPECIFIED: Z59.00

## 2023-03-10 ASSESSMENT — SLEEP AND FATIGUE QUESTIONNAIRES
DO YOU HAVE DIFFICULTY SLEEPING: YES
SLEEP PATTERN: DISTURBED/INTERRUPTED SLEEP;NIGHTMARES/TERRORS
DO YOU USE A SLEEP AID: NO
AVERAGE NUMBER OF SLEEP HOURS: 4

## 2023-03-10 ASSESSMENT — LIFESTYLE VARIABLES
HOW MANY STANDARD DRINKS CONTAINING ALCOHOL DO YOU HAVE ON A TYPICAL DAY: 7 TO 9
HOW OFTEN DO YOU HAVE A DRINK CONTAINING ALCOHOL: 4 OR MORE TIMES A WEEK

## 2023-03-10 ASSESSMENT — PAIN - FUNCTIONAL ASSESSMENT: PAIN_FUNCTIONAL_ASSESSMENT: 0-10

## 2023-03-10 ASSESSMENT — ENCOUNTER SYMPTOMS
SHORTNESS OF BREATH: 0
COUGH: 0
ABDOMINAL PAIN: 0
DIARRHEA: 0
EYE DISCHARGE: 0
SORE THROAT: 0
NAUSEA: 0

## 2023-03-10 ASSESSMENT — PAIN SCALES - GENERAL
PAINLEVEL_OUTOF10: 7
PAINLEVEL_OUTOF10: 0

## 2023-03-10 NOTE — ED PROVIDER NOTES
EMERGENCY DEPARTMENT HISTORY AND PHYSICAL EXAM    Date: 3/10/2023  Patient Name: Promise Rivera    History of Presenting Illness     Chief Complaint   Patient presents with    Cyst     Patient presents to ED with reports of a cyst behind left ear on his scalp that has been there for years and is now causing him pain. He is also requesting assistance from detox and cessation of alcohol, heroin and crack. Last used all 3 last night. Also homeless. States that he has thoughts of wanting to hurt himself, plan would be to walk in front of traffic, states that he recently attempted to do so and has abrasions to his right palm and right hip area from concrete. Denies HI    Suicidal         History Provided By: Patient      Additional History (Context): Promise Rivera is a 33 y.o. male with a history of polysubstance abuse and alcohol abuse who is presenting today for suicidal ideations with a plan to walk into traffic. Patient reports his last alcohol heroin and crack use was last night. Patient also states that he is homeless and does not have any support system. Denies any HI or hallucinations. Reports that he is interested in detoxing from all substances. Patient also reports he has had a cyst on the left side of his head for the past year and wanted to be evaluated for this as well. PCP: None None    No current facility-administered medications for this encounter. No current outpatient medications on file. Past History     Past Medical History:  No past medical history on file. Past Surgical History:  No past surgical history on file. Family History:  No family history on file. Social History: Allergies:  No Known Allergies      Review of Systems   Review of Systems   Constitutional:  Negative for chills and fever. HENT:  Negative for congestion, sneezing and sore throat. Eyes:  Negative for discharge. Respiratory:  Negative for cough and shortness of breath.     Cardiovascular: Negative for chest pain. Gastrointestinal:  Negative for abdominal pain, diarrhea and nausea. Genitourinary:  Negative for dysuria, frequency and urgency. Musculoskeletal:  Negative for arthralgias. Skin:  Negative for rash. Neurological:  Negative for syncope. Psychiatric/Behavioral:  Positive for suicidal ideas. The patient is not nervous/anxious. All other systems reviewed and are negative. All Other Systems Negative  Physical Exam     Vitals:    03/10/23 1219   BP: (!) 152/88   Pulse: 71   Resp: 18   Temp: 98.1 °F (36.7 °C)   TempSrc: Oral   SpO2: 100%   Weight: 150 lb (68 kg)   Height: 6' 3\" (1.905 m)     Physical Exam  Vitals and nursing note reviewed. Constitutional:       General: He is not in acute distress. Appearance: He is not toxic-appearing. HENT:      Head: Atraumatic. Nose: Nose normal.      Mouth/Throat:      Mouth: Mucous membranes are moist.   Eyes:      Extraocular Movements: Extraocular movements intact. Pupils: Pupils are equal, round, and reactive to light. Cardiovascular:      Rate and Rhythm: Normal rate. Pulmonary:      Effort: Pulmonary effort is normal.      Breath sounds: Normal breath sounds. Abdominal:      General: There is no distension. Tenderness: There is no abdominal tenderness. Musculoskeletal:      Cervical back: Normal range of motion. Right lower leg: No edema. Left lower leg: No edema. Skin:     General: Skin is warm and dry. Neurological:      General: No focal deficit present. Mental Status: He is alert and oriented to person, place, and time. Mental status is at baseline. Psychiatric:         Attention and Perception: Attention normal.         Mood and Affect: Mood normal.         Speech: Speech normal.         Behavior: Behavior normal. Behavior is cooperative. Thought Content: Thought content includes suicidal ideation. Thought content does not include homicidal ideation.  Thought content includes suicidal plan. Thought content does not include homicidal plan.          Cognition and Memory: Cognition normal.         Judgment: Judgment normal.         Diagnostic Study Results     Labs -     Recent Results (from the past 12 hour(s))   BMP    Collection Time: 03/10/23 12:43 PM   Result Value Ref Range    Sodium 138 136 - 145 mmol/L    Potassium 3.7 3.5 - 5.5 mmol/L    Chloride 104 100 - 111 mmol/L    CO2 31 21 - 32 mmol/L    Anion Gap 3 3.0 - 18 mmol/L    Glucose 66 (L) 74 - 99 mg/dL    BUN 15 7.0 - 18 MG/DL    Creatinine 0.83 0.6 - 1.3 MG/DL    Bun/Cre Ratio 18 12 - 20      Est, Glom Filt Rate >60 >60 ml/min/1.73m2    Calcium 9.7 8.5 - 10.1 MG/DL   Urine Drug Screen    Collection Time: 03/10/23 12:43 PM   Result Value Ref Range    Benzodiazepines, Urine Negative NEG      Barbiturates, Urine Negative NEG      THC, TH-Cannabinol, Urine Negative NEG      Opiates, Urine Negative NEG      PCP, Urine Negative NEG      Cocaine, Urine Positive (A) NEG      Amphetamine, Urine Negative NEG      Methadone, Urine Negative NEG      Comments: (NOTE)    Ethanol    Collection Time: 03/10/23 12:43 PM   Result Value Ref Range    Ethanol Lvl <3 0 - 3 MG/DL   COVID-19 & Influenza Combo    Collection Time: 03/10/23 12:43 PM    Specimen: Nasopharyngeal   Result Value Ref Range    SARS-CoV-2, PCR Not detected NOTD      Rapid Influenza A By PCR Not detected NOTD      Rapid Influenza B By PCR Not detected NOTD     POCT Glucose    Collection Time: 03/10/23  4:21 PM   Result Value Ref Range    POC Glucose 113 (H) 70 - 110 mg/dL   Hepatic Function Panel    Collection Time: 03/10/23  4:24 PM   Result Value Ref Range    Total Protein 7.8 6.4 - 8.2 g/dL    Albumin 3.5 3.4 - 5.0 g/dL    Globulin 4.3 (H) 2.0 - 4.0 g/dL    Albumin/Globulin Ratio 0.8 0.8 - 1.7      Total Bilirubin 0.3 0.2 - 1.0 MG/DL    Bilirubin, Direct 0.1 0.0 - 0.2 MG/DL    Alk Phosphatase 86 45 - 117 U/L    AST 20 10 - 38 U/L    ALT 24 16 - 61 U/L       Radiologic Studies -   No orders to display           Medical Decision Making   I am the first provider for this patient. I reviewed the vital signs, available nursing notes, past medical history, past surgical history, family history and social history. Vital Signs-Reviewed the patient's vital signs. Records Reviewed: Nursing Notes and Old Medical Records     Procedures: None   Procedures    Provider Notes (Medical Decision Making):     Differential Diagnosis: substance abuse, alcohol intoxication, substance withdrawal, acute psychotic episode, anxiety, panic disorder, lorelei, undifferentiated schizophrenia, depression, SI, HI, medication non-compliance, other mood disorder    Plan: Will medically clear and consult crisis. Will order diet. Did evaluate patient for the cyst on the left side of his scalp however have discussed with him I am more concerned it is cystic in nature not infectious and he will need to follow-up with dermatology. ED Course as of 03/10/23 1946   George Galan Mar 10, 2023   1608 Patient will be admitted to [address] [facility]. Have ordered EKG, hepatic panel and repeat glucose per behavioral health request. [CS]      ED Course User Index  [CS] LETICIA Burnette     7:46 PM : Pt care transferred to  Dr. Rajan Black ,ED provider. History of patient complaint(s), available diagnostic reports and current treatment plan has been discussed thoroughly. Bedside rounding on patient occured : No  Intended disposition of patient : [address] Admission  Pending diagnostics reports and/or labs (please list): Bed assignment       MED RECONCILIATION:  No current facility-administered medications for this encounter. No current outpatient medications on file. Disposition:  [address] Admission to        Medication List      You have not been prescribed any medications. Diagnosis     Clinical Impression:   1. Suicidal ideation    2. Polysubstance abuse ([facility] [address]    3. ETOH abuse    4.  Homeless \"Please note that this dictation was completed with [employer], the computer voice recognition software. Quite often unanticipated grammatical, syntax, homophones, and other interpretive errors are inadvertently transcribed by the computer software. Please disregard these errors. Please excuse any errors that have escaped final proofreading. \"     Darby Woodruff, [address]  03/10/23 1946

## 2023-03-10 NOTE — ED NOTES
Patient received from results waiting at this time. Patient in room 21 quite and calm.  No s/sx of distress noted     Ingrid Hay RN  03/10/23 3038
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
## 2023-03-10 NOTE — ED NOTES
Patient presents to ED with reports of a cyst behind left ear on his scalp that has been there for years and is now causing him pain. He is also requesting assistance from detox and cessation of alcohol, heroin and crack. Last used all 3 last night. Also homeless. States that he has thoughts of wanting to hurt himself, plan would be to walk in front of traffic, states that he recently attempted to do so and has abrasions to his right palm and right hip area from concrete.  Eliana Kemp, MARIA C  03/10/23 3446

## 2023-03-11 PROCEDURE — 6370000000 HC RX 637 (ALT 250 FOR IP): Performed by: PSYCHIATRY & NEUROLOGY

## 2023-03-11 PROCEDURE — 99223 1ST HOSP IP/OBS HIGH 75: CPT | Performed by: PSYCHIATRY & NEUROLOGY

## 2023-03-11 PROCEDURE — 6370000000 HC RX 637 (ALT 250 FOR IP)

## 2023-03-11 PROCEDURE — 1240000000 HC EMOTIONAL WELLNESS R&B

## 2023-03-11 RX ORDER — IBUPROFEN 400 MG/1
800 TABLET ORAL EVERY 6 HOURS PRN
Status: DISCONTINUED | OUTPATIENT
Start: 2023-03-11 | End: 2023-03-11

## 2023-03-11 RX ORDER — NICOTINE 21 MG/24HR
1 PATCH, TRANSDERMAL 24 HOURS TRANSDERMAL DAILY
Status: DISCONTINUED | OUTPATIENT
Start: 2023-03-11 | End: 2023-03-14 | Stop reason: HOSPADM

## 2023-03-11 RX ORDER — IBUPROFEN 400 MG/1
800 TABLET ORAL EVERY 6 HOURS PRN
Status: DISCONTINUED | OUTPATIENT
Start: 2023-03-11 | End: 2023-03-14 | Stop reason: HOSPADM

## 2023-03-11 RX ORDER — TRAZODONE HYDROCHLORIDE 50 MG/1
50 TABLET ORAL NIGHTLY
Status: DISCONTINUED | OUTPATIENT
Start: 2023-03-11 | End: 2023-03-11

## 2023-03-11 RX ORDER — DICYCLOMINE HYDROCHLORIDE 10 MG/1
20 CAPSULE ORAL 4 TIMES DAILY PRN
Status: DISCONTINUED | OUTPATIENT
Start: 2023-03-11 | End: 2023-03-14 | Stop reason: HOSPADM

## 2023-03-11 RX ORDER — HYDROXYZINE PAMOATE 50 MG/1
50 CAPSULE ORAL 3 TIMES DAILY PRN
Status: DISCONTINUED | OUTPATIENT
Start: 2023-03-11 | End: 2023-03-14 | Stop reason: HOSPADM

## 2023-03-11 RX ORDER — BENZTROPINE MESYLATE 1 MG/1
1 TABLET ORAL 2 TIMES DAILY
Status: DISCONTINUED | OUTPATIENT
Start: 2023-03-11 | End: 2023-03-11

## 2023-03-11 RX ORDER — LOPERAMIDE HYDROCHLORIDE 2 MG/1
2 CAPSULE ORAL EVERY 4 HOURS PRN
Status: DISCONTINUED | OUTPATIENT
Start: 2023-03-11 | End: 2023-03-14 | Stop reason: HOSPADM

## 2023-03-11 RX ORDER — QUETIAPINE FUMARATE 25 MG/1
50 TABLET, FILM COATED ORAL NIGHTLY
Status: DISCONTINUED | OUTPATIENT
Start: 2023-03-11 | End: 2023-03-12

## 2023-03-11 RX ORDER — CLONIDINE HYDROCHLORIDE 0.1 MG/1
0.1 TABLET ORAL EVERY 4 HOURS PRN
Status: DISCONTINUED | OUTPATIENT
Start: 2023-03-11 | End: 2023-03-14 | Stop reason: HOSPADM

## 2023-03-11 RX ORDER — HALOPERIDOL 5 MG/1
5 TABLET ORAL EVERY 6 HOURS PRN
Status: DISCONTINUED | OUTPATIENT
Start: 2023-03-11 | End: 2023-03-14 | Stop reason: HOSPADM

## 2023-03-11 RX ORDER — BENZTROPINE MESYLATE 1 MG/ML
1 INJECTION INTRAMUSCULAR; INTRAVENOUS EVERY 6 HOURS PRN
Status: DISCONTINUED | OUTPATIENT
Start: 2023-03-11 | End: 2023-03-14 | Stop reason: HOSPADM

## 2023-03-11 RX ORDER — HALOPERIDOL 5 MG/ML
5 INJECTION INTRAMUSCULAR EVERY 6 HOURS PRN
Status: DISCONTINUED | OUTPATIENT
Start: 2023-03-11 | End: 2023-03-14 | Stop reason: HOSPADM

## 2023-03-11 RX ORDER — POLYETHYLENE GLYCOL 3350 17 G/17G
17 POWDER, FOR SOLUTION ORAL DAILY
Status: DISCONTINUED | OUTPATIENT
Start: 2023-03-11 | End: 2023-03-14 | Stop reason: HOSPADM

## 2023-03-11 RX ADMIN — POLYETHYLENE GLYCOL 3350 17 G: 17 POWDER, FOR SOLUTION ORAL at 17:00

## 2023-03-11 RX ADMIN — QUETIAPINE FUMARATE 50 MG: 25 TABLET ORAL at 20:39

## 2023-03-11 RX ADMIN — BENZTROPINE MESYLATE 1 MG: 1 TABLET ORAL at 08:46

## 2023-03-11 NOTE — H&P
[address] [facility] [address]  Inpatient Admission Note    Date of Service:  03/11/23    Historian(s): Babar Rolon and chart review  Referral Source: Self    Chief Complaint   I was just feeling depressed and suicidal, I had a plan to walk into traffic. History of Present Illness     Babar Rolon is a 33 y.o. Black / [place] American male with a history of depression and anxiety and polysubstance use, admitted with suicidal thoughts and a plan to walk into traffic. On my assessment, patient reports that his life is not going in the right direction and he thought about ending it. He does report at least 1 attempt previously when he walked into traffic. He currently is homeless and is unemployed. He reports being on a psychotropic in the past but does not remember the name. He is originally from [place] and came to [place] a couple of months ago. It is not clear at this time that what was the reason for coming to [place]. He denies any access to guns. He reports that he had been in a rehab program in [place] for several months but did not finish the program.  After getting out of the program, he was clean for about 6 months. He now says, \"I want to go to a rehab program.\"    Psychiatric Review of Systems   Depression: Reports having depression with suicidal thoughts. Anxiety: Reports having anxiety. Irritability: Reports going through a rough phase in life and gets irritable easily. Bipolar symptoms: Denied history of decreased need for sleep associated with increased energy, racing thoughts, rapid speech and risky behavior. Abuse/Trauma/PTSD: Denied history of verbal, physical or sexual abuse. Denies avoidant behavior related to trauma triggers, flashbacks, hypervigilance or nightmares. Psychosis: Denied auditory/visual hallucinations or delusions.     Medical Review of Systems     Sleep: Variable  Appetite: Variable    10 point review of systems was completed. Significant findings are found in the HPI or MSE. Psychiatric Treatment History     Self-injurious behavior/risky thoughts or behaviors (past suicidal ideation/attempt): Patient reports history of attempt on his life by walking into traffic. Violence/Risk to others (past homicidal ideation/attempt): Patient denies. Previous psychiatric medication trials: Patient had been on a psychotropic but does not recall the name. Previous psychiatric hospitalizations: Patient denies    Current therapist: Patient denies    Current psychiatric provider: Patient denies    Allergies    No Known Allergies    Medical History     History reviewed. No pertinent past medical history. History of neurological illness: Patient denies  History of head injuries: Patient denies    Medication(s)   Not taking any medications at this time. Substance Abuse History     Tobacco: 1 pack daily  Alcohol: On a regular basis but denies any history of DTs  Marijuana: On a regular basis and the last use was a day prior to admission  Cocaine: On a regular basis and the last use was a day prior to admission  Opiate: Reports regular use  Benzodiazepine: denied  Other: denied    Consequences: none    History of detox: Yes    History of substance abuse treatment: Reports history of rehab in [place] couple of years ago    Family History     History reviewed. No pertinent family history. Psychiatric Family History  Maternal: Nonspecific history of mental health illness, no reported history of suicide  Paternal: Nonspecific history of mental health illness, no reported history of suicide    Family history of suicide?   No reported history of suicide    Social History     Living Situation: Homeless    Employment: Currently unemployed    Education: Unclear      Relationships/Children: Never , has a 3year-old son with his mom in [address] [place] [address] [place]: Unknown    Spirituality/Faith:     Vitals/Labs      Vitals: 03/10/23 1219 03/10/23 2115 03/10/23 2145 03/11/23 0752   BP: (!) 152/88 (!) 141/74 127/88 105/61   Pulse: 71 67 77 63   Resp: 18 16 16 18   Temp: 98.1 °F (36.7 °C) 98.5 °F (36.9 °C) 98.1 °F (36.7 °C) 97.4 °F (36.3 °C)   TempSrc: Oral Oral Oral Oral   SpO2: 100% 100%  100%   Weight: 150 lb (68 kg)  150 lb (68 kg)    Height: 6' 3\" (1.905 m)  6' 3\" (1.905 m)        Labs:   Results for orders placed or performed during the hospital encounter of 03/10/23   COVID-19 & Influenza Combo    Specimen: Nasopharyngeal   Result Value Ref Range    SARS-CoV-2, PCR Not detected NOTD      Rapid Influenza A By PCR Not detected NOTD      Rapid Influenza B By PCR Not detected NOTD     BMP   Result Value Ref Range    Sodium 138 136 - 145 mmol/L    Potassium 3.7 3.5 - 5.5 mmol/L    Chloride 104 100 - 111 mmol/L    CO2 31 21 - 32 mmol/L    Anion Gap 3 3.0 - 18 mmol/L    Glucose 66 (L) 74 - 99 mg/dL    BUN 15 7.0 - 18 MG/DL    Creatinine 0.83 0.6 - 1.3 MG/DL    Bun/Cre Ratio 18 12 - 20      Est, Glom Filt Rate >60 >60 ml/min/1.73m2    Calcium 9.7 8.5 - 10.1 MG/DL   Urine Drug Screen   Result Value Ref Range    Benzodiazepines, Urine Negative NEG      Barbiturates, Urine Negative NEG      THC, TH-Cannabinol, Urine Negative NEG      Opiates, Urine Negative NEG      PCP, Urine Negative NEG      Cocaine, Urine Positive (A) NEG      Amphetamine, Urine Negative NEG      Methadone, Urine Negative NEG      Comments: (NOTE)    Ethanol   Result Value Ref Range    Ethanol Lvl <3 0 - 3 MG/DL   Hepatic Function Panel   Result Value Ref Range    Total Protein 7.8 6.4 - 8.2 g/dL    Albumin 3.5 3.4 - 5.0 g/dL    Globulin 4.3 (H) 2.0 - 4.0 g/dL    Albumin/Globulin Ratio 0.8 0.8 - 1.7      Total Bilirubin 0.3 0.2 - 1.0 MG/DL    Bilirubin, Direct 0.1 0.0 - 0.2 MG/DL    Alk Phosphatase 86 45 - 117 U/L    AST 20 10 - 38 U/L    ALT 24 16 - 61 U/L   POCT Glucose   Result Value Ref Range    POC Glucose 113 (H) 70 - 110 mg/dL       Mental Status Examination Appearance/Hygiene 33 y.o. Black /  male  Hygiene: In hospital scrubs   Behavior/Social Relatedness Appropriate   Musculoskeletal Gait/Station: appropriate  Tone (flaccid, cogwheeling, spastic): not assessed  Psychomotor (hyperkinetic, hypokinetic): calm  Involuntary movements (tics, dyskinesias, akathisa, stereotypies): none   Speech   Rate, rhythm, volume, fluency and articulation are appropriate   Mood   anxious and depressed   Affect    constricted   Thought Process Linear and goal directed    Vagueness, incoherence, circumstantiality, tangentiality, neologisms, perseveration, flight of ideas, or self-contradictory statements not present on assessment   Thought Content and Perceptual Disturbances Denies delusions, ideas of reference, overvalued ideas, ruminations, obsession, compulsions, and phobias    Reports history of self-injurious behavior (SIB), reports current suicidal ideation (SI), denies aggressive behavior or homicidal ideation (HI)    Denies auditory and visual hallucinations   Sensorium and Cognition  AOx4, attention intact, memory intact, language use appropriate, and fund of knowledge age appropriate   Insight  fair   Judgment fair       Suicide Risk Assessment     Admission  Date/Time: 03/11/23    [x] Admission  [] Discharge     Key Factors:   Current admission precipitated by suicide attempt?   []  Yes     2    [x]  No     1     Suicide Attempt History  [] Past attempts of high lethality    2 [x]  Past attempts of low lethality    1 []  No previous attempts       0   Suicidal Ideation []  Constant suicidal thoughts      2 [x]  Intermittent or fleeting suicidal  thoughts  1 []  Denies current suicidal thoughts    0   Suicide Plan   []  Has plan with actual OR potential access to planned method    2 [x]  Has plan without access to planned method      1 []  No plan            0   Plan Lethality []  Highly lethal plan (Carbon monoxide, gun, hanging, jumping)    2 [x]  Moderate lethality of plan          1 []  Low lethality of plan (biting, head banging, superficial scratching, pillow over face)  0   Safety Plan Agreement  []  Unwilling OR unable to agree due to impaired reality testing   2   []  Patient is ambivalent and/or guarded      1 [x]  Reliably agrees        0   Current Morbid Thoughts (reunion fantasies, preoccupations with death) []  Constantly     2     []  Frequently    1 [x]  Rarely    0   Elopement Risk  []  High risk     2 [x]  Moderate risk    1 []   Low risk    0   Symptoms    []  Hopeless  []  Helpless  []  Anhedonia   []  Guilt/shame  []  Anger/rage  [x]  Anxiety  []  Insomnia   []  Agitation   [x]  Impulsivity  []  5-6 symptoms present    2 []  3-4 symptoms present    1  [x]  0-2 symptoms present    0     Total Score:   --------------------------------------------------------------------------------------------------------------  Subjective Appraisal of Risk:  []  Patient replies not trustworthy: several non-verbal cues. []  Patient replies questionable: trustworthy: at least 1 non-verbal cue. [x]  Patient replies appear trustworthy. Protective measures (select all that apply):  []  Successful past responses to stress  []  Spiritual/Jain beliefs  []  Capacity for reality testing  []  Positive therapeutic relationships  []  Social supports/connections  []  Positive coping skills  []  Frustration tolerance/optimism  []  Children or pets in the home  []  Sense of responsibility to family  [x]  Agrees to treatment plan and follow up    High Risk Diagnoses (select all that apply):  []  Depression/Bipolar Disorder  [x]  Dual Diagnosis  []  Cardiovascular Disease  []  Schizophrenia  []  Chronic Pain  []  Epilepsy  []  Cancer  []  Personality Disorder  []  HIV/AIDS  []  Multiple Sclerosis    Dangerousness Assessment (Suicide, homicide, property destruction. ..)    Risk Factors reviewed and risk assessed to be:  [] low  [x] low-moderate  [] moderate   [] moderate-high  [] high     Protection factors reviewed and risk assessed to be:  [] low  [x] low-moderate  [] moderate   [] moderate-high  [] high     Response to treatment and risk assessed to be:  [] low  [x] low-moderate  [] moderate   [] moderate-high  [] high     Support reviewed and risk assessed to be:  [] low  [x] low-moderate  [] moderate   [] moderate-high  [] high     Acceptance of Discharge and outpatient treatment reviewed and risk assessed to be:    [] low  [x] low-moderate  [] moderate   [] moderate-high  [] high   Overall risk assessed to be:  [] low  [x] low-moderate  [] moderate   [] moderate-high  [] high       Assessment and Plan     Psychiatric Diagnoses:   Patient Active Problem List   Diagnosis    Depression   Polysubstance Use      Psychosocial and contextual factors: Homeless, unemployed, comorbid drug use    Level of impairment/disability: Severe Imogene Pong is a 33 y.o. who is currently requiring acute stabilization after expressing depression as well as suicidal thoughts in the setting of multiple psychosocial stressors as well as comorbid drug use. Admit to locked inpatient behavioral health unit. Start milieu, group, art and occupation therapy. With mutual agreement, patient to start low-dose Seroquel at 50 mg daily night to address depression and anxiety as well as suicidal thoughts. Routine labs ordered and reviewed by this provider. Reviewed instructions, risks, benefits and side effects. Start disposition planning; verify upcoming outpatient appointments with therapist and/or psychiatric medication prescriber.    Tentative date of discharge: 3-5 days                    Behavioral Services  Medicare Certification Upon Admission     I certify that this patient's inpatient psychiatric hospital admission is medically necessary for:       [x] Treatment which could reasonably be expected to improve this patient's condition,        [] For diagnostic study; AND      [x] The inpatient psychiatric services are provided while the individual is under the care of a physician and are included in the individualized plan of care. Estimated length of stay/service is 5 days. Plan for post-hospital care will include OP psychiatric and medical referrals.        Cherelle Stewart MD  Psychiatrist  [facility]

## 2023-03-11 NOTE — PLAN OF CARE
Problem: Self Harm/Suicidality  Goal: Will have no self-injury during hospital stay  Description: INTERVENTIONS:  1. Ensure constant observer at bedside with Q15M safety checks  2. Maintain a safe environment  3. Secure patient belongings  4. Ensure family/visitors adhere to safety recommendations  5. Ensure safety tray has been added to patient's diet order  6. Every shift and PRN: Re-assess suicidal risk via Frequent Screener    Outcome: Progressing     Patient presented to the unit via the ED for \"I want to get off drugs\". Denied SI. Poor eye contact. Speaks in a low volume. Some what guarded. Has a healing abrasion to his right buttock, \"from when I tried to walk into traffic\". No redness, drainage, or odor noted. Stated uses cocaine and heroin almost daily. Requested a drug rehab program at discharge. Offered no physical complaints. For additional information, see crisis note dated 3/10/2023.

## 2023-03-11 NOTE — PROGRESS NOTES
Pt eating lunch when evaluation attempted. Resident to evaluate pt later today.     Samantha Baugh MD    3/11/2023

## 2023-03-11 NOTE — BH NOTE
Pt c/o abrasion on right ventrogluteal.  Approx 1 inch diameter abrasion cleaned w/ ns and dressing applied. No s/s of infection.   Will continue to support

## 2023-03-11 NOTE — PROGRESS NOTES
[facility]  FAMILY MEDICINE CONSULT NOTE FOR BEHAVIORAL HEALTH UNIT    Patient:    Brett Guidry , 33 y.o. male   Room/Bed:  104/01  Admission Date:   3/10/2023  Code status:  No Order      Reason for Consult: admitted to the [place] [address] Unit. Psychiatry Attending Requesting Consult: Dr. Sahra Shetty    ASSESSMENT:  Brett Guidry is a 33 y.o. male w/ a PMHx of polysubstance abuse and alcohol abuse presenting for suicidal ideations with a suicide attempt by walking into traffic who is now admitted to the [place] [address] Unit. Nurse Chaperone during History and Physical: Dr. Carreon Dates:    SI/AH:  -Attempted to commit suicide by jumping into traffic  -Reports AH, but it is a positive voice. Plan  -Continue Seroquel 50mg nightly   -PRN: Cognetin, bentyl, haldol, vistaril  -Management per inpatient psychiatry    Substance use:  -Snorts crack and heroin daily. Also uses which ever drug he gets a hold of.   -Drinks case of beer daily  -Has experienced alcohol withdrawal in the past. Symptoms consisted of chills which he is currently experiencing. Never experienced seizures. Plan:  -Detox, per inpatient psychiatry    Tobacco use:  -0.5-1 PPD   -Currently experiencing cravings. Plan:  -Nicotine patch ordered    Wound (2/2 falling on pavement):  -2cm circular superficial on right upper leg and small bruises on hand. Plan:  -Skin/wound care    Left posterior auricular mass:  -Hard and circumferential, appears to be subepidermal.  -Not currently tender to palpation, but is at times. Plan:  -Consider US of mass     Global  Diet: Regular   Mobility: Per unit, suicide precaution    Thank you for this consult. SUBJECTIVE:   Dr. Sahra Shetty has consulted Family Medicine to evaluate Brett Guidry  in the Emergency Department.  He  is a 33 y.o. male w/ PMHx of polysubstance abuse and alcohol abuse presenting for suicidal ideations with a suicide attempt by walking into traffic who is now admitted to the [place] [address] Unit. He reports using several drugs, most notably heroin, cocaine, and alcohol. He desires detox. He is currently homeless. He currently has some bruises from falling on pavement during his suicide attempt. He has no homicidal thoughts.      CURRENT MEDICATIONS:  Current Facility-Administered Medications   Medication Dose Route Frequency Provider Last Rate Last Admin    haloperidol lactate (HALDOL) injection 5 mg  5 mg IntraMUSCular Q6H PRN Adry Carroll MD        haloperidol (HALDOL) tablet 5 mg  5 mg Oral Q6H PRN Adry Carroll MD        benztropine mesylate (COGENTIN) injection 1 mg  1 mg IntraMUSCular Q6H PRN Adry Carroll MD        hydrOXYzine pamoate (VISTARIL) capsule 50 mg  50 mg Oral TID PRN Adry Carroll MD        cloNIDine (CATAPRES) tablet 0.1 mg  0.1 mg Oral Q4H PRN Adry Carroll MD        trimethobenzamide Sydney Matar) injection 200 mg  200 mg IntraMUSCular Q6H PRN Adry Carroll MD        loperamide (IMODIUM) capsule 2 mg  2 mg Oral Q4H PRN Adry Carroll MD        dicyclomine (BENTYL) capsule 20 mg  20 mg Oral 4x Daily PRN Adry Carroll MD        ibuprofen (ADVIL;MOTRIN) tablet 800 mg  800 mg Oral Q6H PRN Adry Carroll MD        QUEtiapine (SEROQUEL) tablet 50 mg  50 mg Oral Nightly CheryleMD SHARYN Hendrix (positive findings are in BOLD; negative findings are in regular font)  Constitutional: fevers, chills, appetite changes, weight changes, fatigue  HEENT: changes in vision, changes in hearing, sore throat, dysphagia  Cardiovascular: chest pain, palpitations, PND, orthopnea, edema  Pulmonary: SOB, cough, sputum production, wheezing, chest tightness  Gastrointestinal: abdominal pain, nausea/vomiting, diarrhea, constipation, melena, hematochezia  Genitourinary: dysuria, hesitation, dribbling, urgency, hematuria  Musculoskeletal: arthralgias, myalgias  Skin: rash, itching  Neurological: sensory changes, motor changes, headache  Psychiatric: mood changes  Endocrine: heat/cold intolerance  Heme: easy bruising/easy bleeding, LAD    HOME MEDICATIONS:   [unfilled]    MEDICAL HISTORY:  History reviewed. No pertinent past medical history. History reviewed. No pertinent surgical history. History reviewed. No pertinent family history. Social History     Socioeconomic History    Marital status: Single     Spouse name: None    Number of children: None    Years of education: None    Highest education level: None   Tobacco Use    Smoking status: Every Day     Packs/day: 1.00     Types: Cigarettes     Passive exposure: Past   Substance and Sexual Activity    Alcohol use: Yes     Alcohol/week: 24.0 standard drinks     Types: 24 Cans of beer per week     Comment: a case of beer a day    Drug use: Yes     Frequency: 5.0 times per week     Types: Cocaine, Heroin       No Known Allergies        OBJECTIVE:   VITALS  Patient Vitals for the past 24 hrs:   Temp Pulse Resp BP SpO2   03/11/23 0752 97.4 °F (36.3 °C) 63 18 105/61 100 %   03/10/23 2145 98.1 °F (36.7 °C) 77 16 127/88 --   03/10/23 2115 98.5 °F (36.9 °C) 67 16 (!) 141/74 100 %       PHYSICAL EXAM  General: The patient appears comfortable, thin. HEENT: NCAT, PERRLA, EOM intact; oral mucosa well perfused, oropharynx clear, Left posterior auricular mass  Neck: negative, JVD difficult to assess due to obesity  CVS: regular rate and rhythm, S1, S2 normal, no murmur, click, rub or gallop  Lungs: chest clear, no wheezing, rales, normal symmetric air entry    Abdomen: Soft, non-distended, non-TTP, BS+, no organomegaly, no masses  Ext: No calf tenderness, peripheral pulses present, no significant edema. Skin: Warm, Dry, Intact , No significant rashes, 2cm circular superficial scab/wound on right upper leg and small bruises on hand.   Neuro: No focal neurologic deficits or gross abnormalities  Psych: A&Ox3, appropriate mood and affect    LABS, IMAGING, AND DIAGNOSTIC STUDIES  Recent Results (from the past 24 hour(s))   POCT Glucose    Collection Time: 03/10/23  4:21 PM   Result Value Ref Range    POC Glucose 113 (H) 70 - 110 mg/dL   Hepatic Function Panel    Collection Time: 03/10/23  4:24 PM   Result Value Ref Range    Total Protein 7.8 6.4 - 8.2 g/dL    Albumin 3.5 3.4 - 5.0 g/dL    Globulin 4.3 (H) 2.0 - 4.0 g/dL    Albumin/Globulin Ratio 0.8 0.8 - 1.7      Total Bilirubin 0.3 0.2 - 1.0 MG/DL    Bilirubin, Direct 0.1 0.0 - 0.2 MG/DL    Alk Phosphatase 86 45 - 117 U/L    AST 20 10 - 38 U/L    ALT 24 16 - 61 U/L       ================================================================  Assessment and recommendations will be discussed with my attending, who will provide final recommendations.        Malgorzata Will DO PGY1  [facility]  March 11, 2023 2:07 PM

## 2023-03-12 PROCEDURE — 1240000000 HC EMOTIONAL WELLNESS R&B

## 2023-03-12 PROCEDURE — 6370000000 HC RX 637 (ALT 250 FOR IP): Performed by: PSYCHIATRY & NEUROLOGY

## 2023-03-12 PROCEDURE — 6370000000 HC RX 637 (ALT 250 FOR IP)

## 2023-03-12 PROCEDURE — 93005 ELECTROCARDIOGRAM TRACING: CPT | Performed by: STUDENT IN AN ORGANIZED HEALTH CARE EDUCATION/TRAINING PROGRAM

## 2023-03-12 PROCEDURE — 99231 SBSQ HOSP IP/OBS SF/LOW 25: CPT | Performed by: PSYCHIATRY & NEUROLOGY

## 2023-03-12 RX ORDER — QUETIAPINE FUMARATE 25 MG/1
75 TABLET, FILM COATED ORAL NIGHTLY
Status: DISCONTINUED | OUTPATIENT
Start: 2023-03-12 | End: 2023-03-14

## 2023-03-12 RX ORDER — NEOMYCIN SULFATE, POLYMYXIN B SULFATE AND BACITRACIN ZINC 3.5; 10000; 4 MG/G; [USP'U]/G; [USP'U]/G
OINTMENT OPHTHALMIC 2 TIMES DAILY
Status: DISCONTINUED | OUTPATIENT
Start: 2023-03-12 | End: 2023-03-14 | Stop reason: HOSPADM

## 2023-03-12 RX ADMIN — POLYETHYLENE GLYCOL 3350 17 G: 17 POWDER, FOR SOLUTION ORAL at 09:35

## 2023-03-12 RX ADMIN — QUETIAPINE FUMARATE 75 MG: 25 TABLET ORAL at 20:26

## 2023-03-12 RX ADMIN — NEOMYCIN SULFATE, POLYMYXIN B SULFATE AND BACITRACIN ZINC: 3.5; 10000; 4 OINTMENT OPHTHALMIC at 09:42

## 2023-03-12 NOTE — PLAN OF CARE
Problem: Self Harm/Suicidality  Goal: Will have no self-injury during hospital stay  Description: INTERVENTIONS:  1. Ensure constant observer at bedside with Q15M safety checks  2. Maintain a safe environment  3. Secure patient belongings  4. Ensure family/visitors adhere to safety recommendations  5. Ensure safety tray has been added to patient's diet order  6. Every shift and PRN: Re-assess suicidal risk via Frequent Screener    Outcome: Progressing     Problem: Depression  Goal: Patient/caregiver will demonstrate knowledge of depression  Outcome: Progressing     Problem: Anxiety  Goal: Level of anxiety will decrease  Description: Level of anxiety will decrease  Outcome: Progressing     Pt presents with dull affect, depressed mood, with linear and goal-directed thought process. Pt is guarded on approach. Pt has been withdrawn to self on the unit. When asked if he is experiencing hallucinations, pt states, \"Not yet. \" Pt is being treated per COWS protocol, scoring 0 this morning. Pt denies SI/HI at this time. Pt is medication compliant.

## 2023-03-12 NOTE — PROGRESS NOTES
[address]  Inpatient Progress Note     Date of Service: 03/12/23  Hospital Day: 2     Subjective/Interval History   03/12/23    Treatment Team Notes:  Notes reviewed and/or discussed and report that Tabby Yousif is a 33 y.o. Black / [place] American male with a history of depression and anxiety and polysubstance use, admitted with suicidal thoughts and a plan to walk into traffic. Patient interview: Tabby Yousif was interviewed by this writer today. On my assessment today, patient reports mild improvement in mood as well as sleep after starting Seroquel yesterday after admission. So far he has not reported any side effects and we will increase the dose for improved efficacy. Objective     Vitals:    03/12/23 0732   BP: 121/78   Pulse: 74   Resp: 17   Temp: 97.8 °F (36.6 °C)   SpO2:        Recent Results (from the past 24 hour(s))   EKG 12 Lead    Collection Time: 03/12/23 10:09 AM   Result Value Ref Range    Ventricular Rate 81 BPM    Atrial Rate 81 BPM    P-R Interval 126 ms    QRS Duration 90 ms    Q-T Interval 362 ms    QTc Calculation (Bazett) 420 ms    P Axis 88 degrees    R Axis 88 degrees    T Axis 87 degrees    Diagnosis Normal sinus rhythm with sinus arrhythmia        Mental Status Examination     Appearance/Hygiene 33 y.o. Black /  male  Hygiene:  In hospital scrubs   Behavior/Social Relatedness Appropriate   Musculoskeletal Gait/Station: appropriate  Tone (flaccid, cogwheeling, spastic): not assessed  Psychomotor (hyperkinetic, hypokinetic): calm   Involuntary movements (tics, dyskinesias, akathisa, stereotypies): none   Speech   Rate, rhythm, volume, fluency and articulation are appropriate   Mood   A little better   Affect    Mildly improved range   Thought Process Linear and goal directed   Thought Content and Perceptual Disturbances Denies self-injurious behavior (SIB), suicidal ideation (SI), aggressive behavior or homicidal ideation (HI)    Denies auditory and visual hallucinations   Sensorium and Cognition  Grossly intact   Insight  fair   Judgment fair        Assessment/Plan      Psychiatric Diagnoses:   Patient Active Problem List   Diagnosis    Depression   Polysubstance Use      Psychosocial and contextual factors: Homeless, unemployed, comorbid drug use    Level of impairment/disability: Severe Rolland Stare is a 33 y.o. who is currently requiring acute stabilization after expressing depression as well as suicidal thoughts in the setting of multiple psychosocial stressors as well as comorbid drug use. 1.  Increase the dose of Seroquel to 75 mg daily night for improved efficacy. So far patient has not reported any side effects. 2.  Reviewed instructions, risks, benefits and side effects of medications  3.   Disposition/Discharge Date: CAROLA Alex MD DR. [facility]  Psychiatry

## 2023-03-12 NOTE — BH NOTE
A system downtime occurred on March 12, 2023 at ([id number]) (60 minutes) due to [address] Time. During this downtime, paper charting was completed and will be scanned into the patient's electronic medical record.

## 2023-03-13 LAB
EKG ATRIAL RATE: 81 BPM
EKG DIAGNOSIS: NORMAL
EKG P AXIS: 88 DEGREES
EKG P-R INTERVAL: 126 MS
EKG Q-T INTERVAL: 362 MS
EKG QRS DURATION: 90 MS
EKG QTC CALCULATION (BAZETT): 420 MS
EKG R AXIS: 88 DEGREES
EKG T AXIS: 87 DEGREES
EKG VENTRICULAR RATE: 81 BPM

## 2023-03-13 PROCEDURE — 6370000000 HC RX 637 (ALT 250 FOR IP): Performed by: PSYCHIATRY & NEUROLOGY

## 2023-03-13 PROCEDURE — 1240000000 HC EMOTIONAL WELLNESS R&B

## 2023-03-13 PROCEDURE — 99231 SBSQ HOSP IP/OBS SF/LOW 25: CPT | Performed by: PSYCHIATRY & NEUROLOGY

## 2023-03-13 PROCEDURE — 93010 ELECTROCARDIOGRAM REPORT: CPT | Performed by: INTERNAL MEDICINE

## 2023-03-13 RX ORDER — CITALOPRAM 10 MG/1
10 TABLET ORAL DAILY
Status: DISCONTINUED | OUTPATIENT
Start: 2023-03-13 | End: 2023-03-14

## 2023-03-13 RX ADMIN — CITALOPRAM HYDROBROMIDE 10 MG: 10 TABLET ORAL at 11:27

## 2023-03-13 RX ADMIN — QUETIAPINE FUMARATE 75 MG: 25 TABLET ORAL at 20:08

## 2023-03-13 NOTE — BSMART NOTE
Art Therapy Group Progress Note     PATIENT SCHEDULED FOR GROUP AT:    10:25 am     GROUP STOP TIME:  10:55 am    ATTENDANCE:  half (5/12 participants)    PARTICIPATION LEVEL: Declined    ATTENTION LEVEL: N/A    TOPIC / FOCUS:  Mindfulness - Gratitude (meditation, gratitude [place], I am enough mantra sheet)    Luda Veras MA   Art Therapist

## 2023-03-13 NOTE — PROGRESS NOTES
[facility] [address]  Inpatient Progress Note     Date of Service: 03/13/23  Hospital Day: 3     Subjective/Interval History   03/13/23    Treatment Team Notes:  Notes reviewed and/or discussed and report that Promise Rivera is a patient recently admitted to the facility, attention invited to the dictated admission note which is self-explanatory. Patient interview: Promise Rivera was interviewed by this writer today. During the session, the patient described a mild improvement, however still describing the presence of suicidal thoughts. He is able to CFS while in the facility, however not able to do so if discharged from inpatient care. The patient continues to describe to his involvement in several inpatient rehab programs in the past, and continues to indicate that he wants to be referred to inpatient rehabilitation again. The longest that he has been without having to use drugs history. 6 months which happened after he went to rehab program in [place]. The patient is in agreement to be referred to an inpatient rehab program, which he knows he needs. Will discuss with assigned inpatient . Objective     Vitals:    03/13/23 0822   BP: 125/88   Pulse: 80   Resp: 18   Temp: 97.8 °F (36.6 °C)   SpO2: 98%     Vitals are stable    No results found for this or any previous visit (from the past 24 hour(s)). Mental Status Examination     Appearance/Hygiene 33 y.o. Black /  male  Hygiene:   Behavior/Social Relatedness Appropriate   Musculoskeletal Gait/Station: appropriate  Tone (flaccid, cogwheeling, spastic): not assessed  Psychomotor (hyperkinetic, hypokinetic): calm   Involuntary movements (tics, dyskinesias, akathisa, stereotypies): none   Speech   Rate, rhythm, volume, fluency and articulation are appropriate   Mood   Depressed   Affect    labile at times.    Thought Process Linear and goal directed   Thought Content and Perceptual Disturbances Suicidal thoughts are still present. Denies wanting to harm anyone else. Denies auditory and visual hallucinations, ideas of reference or influence or any delusions   Sensorium and Cognition  Grossly intact   Insight  Fair   Judgment Fair        Assessment/Plan      Psychiatric Diagnoses:   Patient Active Problem List   Diagnosis    Depression       Medical Diagnoses: Major depressive disorder recurrent without psychotic symptoms. Polysubstance use disorder. Rule out depression in association to substance use disorder. Psychosocial and contextual factors: See admission note    Level of impairment/disability:  TBD. 1.  The patient was a started over the weekend with Seroquel 75 mg at bedtime by my coverage. Agree with utilizing Seroquel for antidepressant augmentation as antidepressants are obviously indicated. We will start treatment with citalopram 10 mg every morning. Seroquel dose will be maintained the same. 2.  Reviewed instructions, risks, benefits and side effects of medications  3. Disposition/Discharge Date: Inpatient rehab program as indicated.     Earna Hodgkins, MD, [facility]  Psychiatry

## 2023-03-13 NOTE — GROUP NOTE
Group Therapy Note    Date: 3/12/2023    Group Start Time: 2000  Group End Time: 2030  Group Topic: Medication    [address] ADULT CHEM DEP    Marcelino Valero RN        Group Therapy Note    Attendees: 6       Patient's Goal:  Medication compliance. Status After Intervention:  Unchanged    Participation Level:  Active Listener    Participation Quality: Appropriate      Speech:  normal      Thought Process/Content: Logical      Affective Functioning: Flat      Mood: euthymic      Level of consciousness:  Alert      Response to Learning: Capable of insight      Endings: None Reported    Modes of Intervention: Education and Support      Discipline Responsible: Registered Nurse      Signature:  Cathy Maki RN

## 2023-03-13 NOTE — PLAN OF CARE
Problem: Self Harm/Suicidality  Goal: Will have no self-injury during hospital stay  Description: INTERVENTIONS:  1. Ensure constant observer at bedside with Q15M safety checks  2. Maintain a safe environment  3. Secure patient belongings  4. Ensure family/visitors adhere to safety recommendations  5. Ensure safety tray has been added to patient's diet order  6. Every shift and PRN: Re-assess suicidal risk via Frequent Screener    Outcome: Progressing     Problem: Depression  Goal: Patient/caregiver will demonstrate knowledge of depression  Outcome: Progressing       Patient received this am alert and verbal, refused all meds and treatment, took afternoon celexa, stated he was felt a little better but felt a little anxious, no thoughts of harm to self, eating well, remains in room after meals, will continue to monitor.

## 2023-03-13 NOTE — BH NOTE
Pt up and about in the unit per usual. Pt has been compliant with unit rules. He interacts well with staff but minimal interaction with peers. Pt continue to utilize non skid footwear per unit rules, currently no concerns for safety issues. Pt denies SI/HI or hallucination. Pt encouraged to talk to staff incase any issues arises. Continue to monitor per facility protocol.

## 2023-03-13 NOTE — BSMART NOTE
Art Therapy Group Progress Note     PATIENT SCHEDULED FOR GROUP AT:    1:20 pm     GROUP STOP TIME:  1:55 pm    ATTENDANCE: one sixth (2/12 participants)     PARTICIPATION LEVEL:  Declined    Pt sleeping.      ATTENTION LEVEL: N/A     TOPIC / FOCUS: Mindful Slow Drawing (Reverse Watercolor)     Matt Farley MA   Art Therapist

## 2023-03-13 NOTE — BSMART NOTE
Biopsychosocial Assessment    Current Level of Psychosocial Functioning     [x]Independent  []Dependent  []Minimal Assist      Comments:      Psychosocial High Risk Factors (check all that apply)      []Unable to obtain meds                                                               []Chronic illness/pain    [x]Substance abuse   [x]Lack of Family Support   [x]Financial stress   [x]Isolation   [x]Inadequate Community Resources  [x]Suicide attempt(s)  [x]Not taking medications   []Victim of crime   []Developmental Delay  []Unable to manage personal needs    []Age 72 or older   []  Homeless  []Sandra transportation   []Readmission within 30 days  []Unemployment  []Traumatic Event

## 2023-03-13 NOTE — BH NOTE
Pt appeared to have slept for 7+ hours thus far. Will continue to monitor for safety and changes in behavior.

## 2023-03-14 VITALS
HEIGHT: 75 IN | RESPIRATION RATE: 18 BRPM | DIASTOLIC BLOOD PRESSURE: 63 MMHG | OXYGEN SATURATION: 99 % | SYSTOLIC BLOOD PRESSURE: 107 MMHG | BODY MASS INDEX: 18.65 KG/M2 | HEART RATE: 63 BPM | WEIGHT: 150 LBS | TEMPERATURE: 97.8 F

## 2023-03-14 PROCEDURE — 6370000000 HC RX 637 (ALT 250 FOR IP)

## 2023-03-14 PROCEDURE — 6370000000 HC RX 637 (ALT 250 FOR IP): Performed by: PSYCHIATRY & NEUROLOGY

## 2023-03-14 RX ORDER — QUETIAPINE FUMARATE 25 MG/1
50 TABLET, FILM COATED ORAL NIGHTLY
Status: DISCONTINUED | OUTPATIENT
Start: 2023-03-14 | End: 2023-03-14 | Stop reason: HOSPADM

## 2023-03-14 RX ORDER — NEOMYCIN SULFATE, POLYMYXIN B SULFATE AND BACITRACIN ZINC 3.5; 10000; 4 MG/G; [USP'U]/G; [USP'U]/G
OINTMENT OPHTHALMIC
Qty: 3.5 G | Refills: 0 | Status: SHIPPED | OUTPATIENT
Start: 2023-03-14 | End: 2023-03-24

## 2023-03-14 RX ORDER — CITALOPRAM 20 MG/1
20 TABLET ORAL DAILY
Qty: 30 TABLET | Refills: 0 | Status: SHIPPED | OUTPATIENT
Start: 2023-03-15

## 2023-03-14 RX ORDER — QUETIAPINE FUMARATE 50 MG/1
50 TABLET, FILM COATED ORAL NIGHTLY
Qty: 30 TABLET | Refills: 0 | Status: SHIPPED | OUTPATIENT
Start: 2023-03-14

## 2023-03-14 RX ORDER — CITALOPRAM 20 MG/1
20 TABLET ORAL DAILY
Status: DISCONTINUED | OUTPATIENT
Start: 2023-03-15 | End: 2023-03-14 | Stop reason: HOSPADM

## 2023-03-14 RX ADMIN — CITALOPRAM HYDROBROMIDE 10 MG: 10 TABLET ORAL at 10:34

## 2023-03-14 RX ADMIN — POLYETHYLENE GLYCOL 3350 17 G: 17 POWDER, FOR SOLUTION ORAL at 10:34

## 2023-03-14 NOTE — PLAN OF CARE
Problem: Self Harm/Suicidality  Goal: Will have no self-injury during hospital stay  Description: INTERVENTIONS:  1. Ensure constant observer at bedside with Q15M safety checks  2. Maintain a safe environment  3. Secure patient belongings  4. Ensure family/visitors adhere to safety recommendations  5. Ensure safety tray has been added to patient's diet order  6. Every shift and PRN: Re-assess suicidal risk via Frequent Screener    Outcome: Progressing     Problem: Depression  Goal: Patient/caregiver will demonstrate knowledge of depression  Outcome: Progressing     Problem: Anxiety  Goal: Level of anxiety will decrease  Description: Level of anxiety will decrease  Outcome: Progressing    Assumed care of pt at 0730. Pt is calm and cooperative. Pt denies SI/HI/SH thoughts at this time and has contracted for safety. Pt denies ALL hallucinations at this time. Pt is compliant with prescribed medications, no prn medications given at this time. Pt is looking forward to discharge today. No other concerns at this time, will monitor for safety, location, and comfort.

## 2023-03-14 NOTE — BSMART NOTE
The undersigned covering clinician met with the patient to discuss his progress and discharge plan. Patient was calm and cooperative and appeared to be in a good mood. Patient denied SI/HI and AVH. Patient reported that he met with the psychiatrist and is being discharged today. He stated he feels better than he has in a long time and attributed it to his medication. He indicated he had never been on anything that he is currently taking. Patient reported that his mother is coming to pick him up and that he wants to return to [place] and go to [facility]. He described this as a [employer] based place for men where he can get help with his addiction and have a place to stay. Patient was able to identify what is motivating him to stay drug free. He also identified his mother as one of his primary supports. Patient encouraged to follow up outpatient treatment and to utilize support. Milo Floey.  Maria Del Rosario Rosales, [facility]

## 2023-03-14 NOTE — PROGRESS NOTES
Assumed care of pt at 0730. Pt was in his room resting in position of comfort. Respirations equal and unlabored, no distress noted. Will monitor for safety, location, and comfort.

## 2023-03-14 NOTE — DISCHARGE INSTRUCTIONS
Patient is to be transported by mother.      Discharging to [facility]   [address]  [phone]    Patient to follow up with either of the following providers, for medication management and counseling:  MAGDALENA TODD [address], [facility] [address]    [address]  [phone]

## 2023-03-14 NOTE — PROGRESS NOTES
Pt discharged from the [facility]. Pt is cooperative and calm. Pt denies SI/HI/SH thoughts at this time and has contracted for safety. Pt denies ALL hallucinations at this time. Pt is in possession of dc paperwork and prescriptions. Pt verbalizes understanding of dc instructions and follow up instructions.

## 2023-03-19 NOTE — DISCHARGE SUMMARY
[facility] DISCHARGE    Name:  Deny Varma  MR#:   [id number]  :  1990  ACCOUNT #:  [de-identified]  ADMIT DATE:  03/10/2023  DISCHARGE DATE:  2023      SIGNIFICANT FINDINGS:  History and physical exam was performed shortly after the patient was admitted to the facility by Dr. Bernardo Grady covering for the undersigned, attention invited to this document, a place where Dr. Jairo Franco described the patient's prior psychiatric history, reasons for which he came to our emergency room, findings upon his being examined there and so the reason for which he was admitted to inpatient psychiatric care. Per Dr. Jairo Franco, the patient reported that his life was not going in the way that he wanted it to go and was thinking about ending it. He reported at least one attempt at which time he allegedly walked into traffic. Describing himself as homeless, being on psychotropics in the past, but not remembering which medications were prescribed for him then, the patient is originally from [place], came to [place] a couple of months prior, not clear as to why he came to this area; however, at the time of the examination, he denied access to guns; however, described the presence of suicidal thoughts as indicated. He reported also that he had been in a rehab program in [place] for several months, however did not finish the program.  Of importance is that when the patient went through the rehab program, he was able to maintain abstinence from drugs for around 6 months. At the time of this current admission, the patient wanted to be in a rehab program which he verbalized. Physical exam at the time of the patient's examination in the emergency room described vital signs in nursing notes reviewed. His physical exam was basically negative with the exception of his psychiatric examination, which was described that of a patient with suicidal ideations and a suicidal plan.   His review of systems have been described negative with the exception again of his psychiatric review of systems that was positive for suicidal ideas. Multiple labs were performed at the time of the patient's evaluation in the emergency room. They included a BMP that showed completely normal test results, alcohol levels below 3. Urine drug screen positive only for cocaine. COVID-2 by PCR, the same as influenza A and influenza B by PCR negative. COURSE DURING HOSPITALIZATION AND TREATMENT:  The patient was admitted to the adult unit, a place where he was seen daily and was referred to the groups within context of the program.  Physical exam was performed by members of [address] [place] [address], [facility] invited to the physical exam which is self-explanatory. The physical exam basically confirmed findings upon the patient's examination in the emergency room, but in addition the presence of 2 cm several superficial abrasions on the patient's right upper leg and small bruises on his hand were noted. He suggested skin and wound care with prescriptions for topical antibiotics being prescribed. During the patient's admission, the patient was suggested to have complete labs; however, before he agreed to do so, he demanded discharge indicating that his mother was going to pick him up with the intention to take him back to [place]. At the time in which the patient was requesting discharge, examination by the undersigned was that of a patient who was not psychotic, describing his depression being under control, denying any medications-related side effects as he had agreed to prescriptions for citalopram which he was taking 20 mg a day and also a low dose of quetiapine for antidepressant augmentation, 50 mg nightly. For the skin abrasions, neomycin, bacitracin zinc, polymyxin 5-400-100,000 four times daily was prescribed, also very well tolerated by the patient.   Found not detainable and stable, the patient request was then granted. FINAL DIAGNOSES:  AXIS I:  Major depressive disorder, recurrent, without psychotic symptoms. Polysubstance use disorder, mostly cocaine and cannabis. Rule out depression in association to substance use disorder. AXIS II:  Deferred. AXIS III:  Abrasions left lower extremity, under treatment with antibiotic therapy. DISPOSITION:  The patient was discharged and strongly suggested to continue with further outpatient treatment. He indicated that he was planning to go back to [place] and to seek admission to a rehab program.  Again, he mentioned that his mother was going to pick him up to be able to take him back there. At the time of discharge, there was no evidence of medications-related side effects and so he was strongly suggested for taking the prescriptions mentioned above. PRESCRIPTIONS UPON DISCHARGE:  Prescriptions for;  1.  Citalopram 20 mg every morning. 2.  Quetiapine 50 mg at night. 3.  The same as a prescription for neomycin, bacitracin and Neosporin 5-400-100,000, 3.5 g provided. PROGNOSIS:  Fair to guarded depending upon the patient's treatment compliance.         Hebert Arreguin MD, LFAPA      [id number]  D:  03/18/2023 17:22  T:  03/19/2023 3:55  JOB #:  [id number]

## 2024-06-06 NOTE — BSMART NOTE
Art Therapy Group Progress Note     PATIENT SCHEDULED FOR GROUP AT:    10:40 am    GROUP STOP TIME:  11:00 am     ATTENDANCE:  1/6 participants STU1    PARTICIPATION LEVEL: Declined    Pt stated he would attend group, however he remained in his room.      ATTENTION LEVEL: N/A    TOPIC / FOCUS:  Reality Orientation - complete the picture    Torie Fowler MA   Art Therapist Follow up questions to visit with Dr. Centeno on 5/31/24.    Jessica MORENO CMA ([facility])